# Patient Record
Sex: MALE | Race: BLACK OR AFRICAN AMERICAN | NOT HISPANIC OR LATINO | Employment: OTHER | URBAN - METROPOLITAN AREA
[De-identification: names, ages, dates, MRNs, and addresses within clinical notes are randomized per-mention and may not be internally consistent; named-entity substitution may affect disease eponyms.]

---

## 2018-02-19 ENCOUNTER — HOSPITAL ENCOUNTER (EMERGENCY)
Facility: OTHER | Age: 33
Discharge: HOME | End: 2018-02-19
Attending: EMERGENCY MEDICINE
Payer: MEDICAID

## 2018-02-19 VITALS
BODY MASS INDEX: 45.1 KG/M2 | HEIGHT: 70 IN | TEMPERATURE: 98 F | OXYGEN SATURATION: 98 % | HEART RATE: 80 BPM | SYSTOLIC BLOOD PRESSURE: 125 MMHG | WEIGHT: 315 LBS | DIASTOLIC BLOOD PRESSURE: 80 MMHG | RESPIRATION RATE: 18 BRPM

## 2018-02-19 DIAGNOSIS — G89.29 CHRONIC PAIN OF LEFT KNEE: ICD-10-CM

## 2018-02-19 DIAGNOSIS — M25.562 CHRONIC PAIN OF LEFT KNEE: ICD-10-CM

## 2018-02-19 DIAGNOSIS — M54.40 LOW BACK PAIN WITH SCIATICA, SCIATICA LATERALITY UNSPECIFIED, UNSPECIFIED BACK PAIN LATERALITY, UNSPECIFIED CHRONICITY: ICD-10-CM

## 2018-02-19 DIAGNOSIS — R30.0 DYSURIA: Primary | ICD-10-CM

## 2018-02-19 LAB
BILIRUBIN, POC UA: ABNORMAL
BLOOD, POC UA: NEGATIVE
CLARITY, POC UA: CLEAR
COLOR, POC UA: YELLOW
GLUCOSE SERPL-MCNC: 93 MG/DL (ref 70–110)
GLUCOSE, POC UA: NEGATIVE
KETONES, POC UA: ABNORMAL
LEUKOCYTE EST, POC UA: ABNORMAL
NITRITE, POC UA: NEGATIVE
PH UR STRIP: 6.5 [PH]
PROTEIN, POC UA: ABNORMAL
SPECIFIC GRAVITY, POC UA: 1.02
UROBILINOGEN, POC UA: >=8 E.U./DL

## 2018-02-19 PROCEDURE — 99283 EMERGENCY DEPT VISIT LOW MDM: CPT

## 2018-02-19 PROCEDURE — 87491 CHLMYD TRACH DNA AMP PROBE: CPT

## 2018-02-19 PROCEDURE — 81003 URINALYSIS AUTO W/O SCOPE: CPT

## 2018-02-19 PROCEDURE — 87086 URINE CULTURE/COLONY COUNT: CPT

## 2018-02-19 RX ORDER — DICLOFENAC SODIUM 50 MG/1
50 TABLET, DELAYED RELEASE ORAL 3 TIMES DAILY PRN
Qty: 21 TABLET | Refills: 0 | Status: SHIPPED | OUTPATIENT
Start: 2018-02-19 | End: 2019-01-11 | Stop reason: SDUPTHER

## 2018-02-20 LAB
C TRACH DNA SPEC QL NAA+PROBE: NOT DETECTED
N GONORRHOEA DNA SPEC QL NAA+PROBE: NOT DETECTED

## 2018-02-20 NOTE — ED PROVIDER NOTES
Encounter Date: 2/19/2018       History     Chief Complaint   Patient presents with    urinary symptoms     Frequency and discomfort when urinating, low back pain x 3 days. Pt also c/o left knee pain.     A 32-year-old male who presents to the ED with urinary frequency and dysuria and lower back pain for 3 days.  Patient reports left knee pain.  Patient has had a knee surgery and has chronic left knee pain.  Patient denies injury.      The history is provided by the patient.   Male  Problem   Primary symptoms include dysuria.  Primary symptoms include no genital lesions, no penile discharge. The current episode started several days ago. The problem has been gradually worsening. The symptoms occur during urination. Associated symptoms include frequency. Pertinent negatives include no nausea. The treatment provided no relief. Sexual activity: sexually active. Partner displays symptoms of an STD: no.     Review of patient's allergies indicates:  No Known Allergies  Past Medical History:   Diagnosis Date    Allergy     Fever blister     Hypertension     Irregular heart beat     Joint pain     Left ACL tear 6/8/2015     Past Surgical History:   Procedure Laterality Date    KNEE SURGERY      TONSILLECTOMY       Family History   Problem Relation Age of Onset    Aneurysm Mother     Hypertension Mother     No Known Problems Father      Social History   Substance Use Topics    Smoking status: Current Every Day Smoker     Packs/day: 0.50     Years: 10.00     Types: Cigarettes    Smokeless tobacco: Never Used    Alcohol use Yes      Comment: Occasional      Review of Systems   Constitutional: Negative.  Negative for fever.   HENT: Negative.  Negative for sore throat.    Eyes: Negative.    Respiratory: Negative.  Negative for shortness of breath.    Cardiovascular: Negative for chest pain.   Gastrointestinal: Negative for nausea.   Endocrine: Negative.    Genitourinary: Positive for dysuria and frequency.  Negative for penile discharge.   Musculoskeletal: Positive for back pain.        Knee pain   Skin: Negative.  Negative for rash.   Allergic/Immunologic: Negative.    Neurological: Negative.  Negative for weakness.   Hematological: Does not bruise/bleed easily.   Psychiatric/Behavioral: Negative.    All other systems reviewed and are negative.      Physical Exam     Initial Vitals [02/19/18 1949]   BP Pulse Resp Temp SpO2   (!) 168/94 92 19 98.7 °F (37.1 °C) 98 %      MAP       118.67         Physical Exam    Nursing note and vitals reviewed.  Constitutional: He appears well-nourished. He is Obese .   HENT:   Head: Normocephalic and atraumatic.   Right Ear: External ear normal.   Left Ear: External ear normal.   Nose: Nose normal.   Mouth/Throat: Oropharynx is clear and moist.   Eyes: Conjunctivae and lids are normal. Pupils are equal, round, and reactive to light.   Neck: Normal range of motion. Neck supple.   Cardiovascular: Normal rate, regular rhythm, S1 normal, S2 normal and normal heart sounds.   Pulmonary/Chest: Effort normal and breath sounds normal.   Abdominal: Soft. Normal appearance and bowel sounds are normal. There is no tenderness.   Genitourinary: Testes normal. Uncircumcised. No penile tenderness. No discharge found.   Genitourinary Comments: No lesions noted   Musculoskeletal: Normal range of motion.        Left knee: He exhibits no swelling and no effusion. No tenderness found. No medial joint line and no lateral joint line tenderness noted.   FROM of all extremities   Neurological: He is alert and oriented to person, place, and time. He has normal strength.   Skin: Skin is warm, dry and intact.   Psychiatric: He has a normal mood and affect. His speech is normal and behavior is normal. Judgment normal.         ED Course   Procedures  Labs Reviewed   POCT URINALYSIS W/O SCOPE - Abnormal; Notable for the following:        Result Value    Glucose, UA Negative (*)     Bilirubin, UA 1+ (*)     Ketones,  UA 1+ (*)     Blood, UA Negative (*)     Protein, UA 1+ (*)     Urobilinogen, UA >=8.0 (*)     Nitrite, UA Negative (*)     Leukocytes, UA Trace (*)     All other components within normal limits   POCT URINALYSIS W/O SCOPE             Medical Decision Making:   Initial Assessment:   A 32-year-old male who presents to the ED with dysuria and urinary frequency low back pain ×3 days.  Patient reports chronic left knee pain.  Patient has had a knee surgery in the past.  Patient denies injury.  Urinalysis within normal limits.  Discuss possibilities of STD exposure.  Patient states he is not sure.  Patient denies [place] discharge or lesions.  Differential Diagnosis:   Urinary tract infection  STD exposure  Chronic left knee pain  ED Management:  Physical exam.  Urinalysis.  Urine culture pending.  Gonorrhea and chlamydia.  Glucose POC.  Patient discharged home with diclofenac when necessary.  Follow-up with PCP in 2-3 days.  Return to ED for worsening of symptoms.                      Clinical Impression:   The primary encounter diagnosis was Dysuria. Diagnoses of Chronic pain of left knee and Low back pain with sciatica, sciatica laterality unspecified, unspecified back pain laterality, unspecified chronicity were also pertinent to this visit.                           Marsha Rizzo, STEWART  02/19/18 8701

## 2018-02-21 LAB — BACTERIA UR CULT: NO GROWTH

## 2018-08-19 ENCOUNTER — HOSPITAL ENCOUNTER (EMERGENCY)
Facility: HOSPITAL | Age: 33
Discharge: HOME | End: 2018-08-19
Attending: EMERGENCY MEDICINE
Payer: MEDICARE

## 2018-08-19 VITALS
DIASTOLIC BLOOD PRESSURE: 78 MMHG | WEIGHT: 315 LBS | RESPIRATION RATE: 19 BRPM | HEIGHT: 70 IN | OXYGEN SATURATION: 98 % | SYSTOLIC BLOOD PRESSURE: 130 MMHG | BODY MASS INDEX: 45.1 KG/M2 | TEMPERATURE: 99 F | HEART RATE: 93 BPM

## 2018-08-19 DIAGNOSIS — R05.9 COUGH: ICD-10-CM

## 2018-08-19 DIAGNOSIS — R53.1 GENERALIZED WEAKNESS: Primary | ICD-10-CM

## 2018-08-19 LAB
ALBUMIN SERPL-MCNC: 3.6 G/DL (ref 3.3–5.5)
ALP SERPL-CCNC: 40 U/L (ref 42–141)
BASOPHILS # BLD AUTO: 0.02 K/UL
BASOPHILS NFR BLD: 0.2 %
BILIRUB SERPL-MCNC: 1.5 MG/DL (ref 0.2–1.6)
BILIRUBIN, POC UA: NEGATIVE
BLOOD, POC UA: NEGATIVE
BUN SERPL-MCNC: 9 MG/DL (ref 7–22)
CALCIUM SERPL-MCNC: 9.1 MG/DL (ref 8–10.3)
CHLORIDE SERPL-SCNC: 99 MMOL/L (ref 98–108)
CLARITY, POC UA: CLEAR
COLOR, POC UA: ABNORMAL
CREAT SERPL-MCNC: 1 MG/DL (ref 0.6–1.2)
DIFFERENTIAL METHOD: ABNORMAL
EOSINOPHIL # BLD AUTO: 0 K/UL
EOSINOPHIL NFR BLD: 0.1 %
ERYTHROCYTE [DISTWIDTH] IN BLOOD BY AUTOMATED COUNT: 13.4 %
GLUCOSE SERPL-MCNC: 94 MG/DL (ref 73–118)
GLUCOSE, POC UA: NEGATIVE
HCT VFR BLD AUTO: 44.2 %
HGB BLD-MCNC: 15.1 G/DL
KETONES, POC UA: NEGATIVE
LEUKOCYTE EST, POC UA: ABNORMAL
LYMPHOCYTES # BLD AUTO: 1.1 K/UL
LYMPHOCYTES NFR BLD: 8.9 %
MCH RBC QN AUTO: 29.8 PG
MCHC RBC AUTO-ENTMCNC: 34.2 G/DL
MCV RBC AUTO: 87 FL
MONOCYTES # BLD AUTO: 1.2 K/UL
MONOCYTES NFR BLD: 9.1 %
NEUTROPHILS # BLD AUTO: 10.3 K/UL
NEUTROPHILS NFR BLD: 81.2 %
NITRITE, POC UA: NEGATIVE
PH UR STRIP: 5.5 [PH]
PLATELET # BLD AUTO: 285 K/UL
PMV BLD AUTO: 9.5 FL
POC ALT (SGPT): 35 U/L (ref 10–47)
POC AST (SGOT): 35 U/L (ref 11–38)
POC TCO2: 28 MMOL/L (ref 18–33)
POTASSIUM BLD-SCNC: 3.2 MMOL/L (ref 3.6–5.1)
PROTEIN, POC UA: NEGATIVE
PROTEIN, POC: 7.5 G/DL (ref 6.4–8.1)
RBC # BLD AUTO: 5.07 M/UL
SODIUM BLD-SCNC: 138 MMOL/L (ref 128–145)
SPECIFIC GRAVITY, POC UA: >=1.03
UROBILINOGEN, POC UA: 0.2 E.U./DL
WBC # BLD AUTO: 12.66 K/UL

## 2018-08-19 PROCEDURE — 99284 EMERGENCY DEPT VISIT MOD MDM: CPT

## 2018-08-19 PROCEDURE — 93005 ELECTROCARDIOGRAM TRACING: CPT

## 2018-08-19 PROCEDURE — 85025 COMPLETE CBC W/AUTO DIFF WBC: CPT | Mod: 91

## 2018-08-19 PROCEDURE — 25000003 PHARM REV CODE 250: Performed by: EMERGENCY MEDICINE

## 2018-08-19 PROCEDURE — 81003 URINALYSIS AUTO W/O SCOPE: CPT

## 2018-08-19 PROCEDURE — 85025 COMPLETE CBC W/AUTO DIFF WBC: CPT

## 2018-08-19 PROCEDURE — 93010 ELECTROCARDIOGRAM REPORT: CPT | Mod: ,,, | Performed by: INTERNAL MEDICINE

## 2018-08-19 PROCEDURE — 80053 COMPREHEN METABOLIC PANEL: CPT

## 2018-08-19 RX ORDER — POTASSIUM CHLORIDE 20 MEQ/1
40 TABLET, EXTENDED RELEASE ORAL
Status: COMPLETED | OUTPATIENT
Start: 2018-08-19 | End: 2018-08-19

## 2018-08-19 RX ADMIN — POTASSIUM CHLORIDE 40 MEQ: 1500 TABLET, EXTENDED RELEASE ORAL at 05:08

## 2018-08-19 NOTE — ED PROVIDER NOTES
Encounter Date: 8/19/2018       History     Chief Complaint   Patient presents with    Fatigue     pt reports since 1am this morning, his body has been weak. pt also reports chills and sweats since yesterday. pt denies any chest pain or SOB.     Chief complaint:  Body aches  33-year-old said that he awoke around 1:00 a.m. this morning with generalized body aches that he describes as stabbing.  He also felt hot and cold.  He slept most of the day.  Patient feels mildly weak.  His symptoms have been constant.  He had 1 episode of diarrhea.  He does report dysuria.  He has a mild cough.  He has a slight headache and dizziness.  Patient did not take anything for his symptoms. He denies alcohol or drug abuse.      The history is provided by the patient.     Review of patient's allergies indicates:  No Known Allergies  Past Medical History:   Diagnosis Date    Allergy     Fever blister     Hypertension     Irregular heart beat     Joint pain     Left ACL tear 6/8/2015     Past Surgical History:   Procedure Laterality Date    KNEE SURGERY      TONSILLECTOMY       Family History   Problem Relation Age of Onset    Aneurysm Mother     Hypertension Mother     No Known Problems Father      Social History     Tobacco Use    Smoking status: Current Every Day Smoker     Packs/day: 0.50     Years: 10.00     Pack years: 5.00     Types: Cigarettes    Smokeless tobacco: Never Used   Substance Use Topics    Alcohol use: Yes     Comment: Occasional     Drug use: No     Review of Systems   Constitutional: Positive for fatigue. Negative for fever.        Hot and cold   HENT: Negative for sore throat.    Eyes: Negative for photophobia.   Respiratory: Positive for cough. Negative for shortness of breath.    Cardiovascular: Negative for chest pain.   Gastrointestinal: Positive for diarrhea (X1). Negative for nausea.   Genitourinary: Positive for dysuria.   Musculoskeletal: Positive for myalgias. Negative for back pain.    Skin: Negative for rash.   Neurological: Positive for dizziness and headaches. Negative for weakness.       Physical Exam     Initial Vitals [08/19/18 1610]   BP Pulse Resp Temp SpO2   (!) 147/89 108 19 98.9 °F (37.2 °C) 98 %      MAP       --         Physical Exam    Constitutional: He appears well-developed and well-nourished. He is Obese .   HENT:   Head: Normocephalic and atraumatic.   Eyes: EOM are normal. Pupils are equal, round, and reactive to light.   Neck: Neck supple.   Cardiovascular: Normal rate, regular rhythm and normal heart sounds.   Pulmonary/Chest: Breath sounds normal.   Abdominal: Soft. There is no tenderness. There is no rebound and no guarding.   Musculoskeletal: Normal range of motion.   Neurological: He is alert and oriented to person, place, and time. No cranial nerve deficit.   Skin: Skin is warm and dry.   Psychiatric: He has a normal mood and affect.         ED Course   Procedures  Labs Reviewed   POCT CBC   POCT URINALYSIS W/O SCOPE   POCT CMP     EKG Readings: (Independently Interpreted)   Initial Reading: No STEMI.   Normal sinus rhythm, heart rate 96, no ST segment elevation, normal QT, normal PA, normal axis       Imaging Results    None          Medical Decision Making:   Initial Assessment:   33-year-old who complains of generalized body aches.  Patient also reports feeling hot and cold.  On exam patient's vital signs are stable.  He has no significant findings.  ED Management:  Patient will be evaluated with labs as well as EKG, chest x-ray and orthostatics.  Patient's lab showed no significant abnormalities.  Etiology of his symptoms is unclear.  Patient may be starting with an early viral syndrome.  He was advised to rest and drink plenty of fluids.  He was given strict return precautions.                      Clinical Impression:   The primary encounter diagnosis was Generalized weakness. A diagnosis of Cough was also pertinent to this visit.                             Roshni  AGUSTIN Gonzalez MD  08/19/18 0376

## 2018-11-19 ENCOUNTER — HOSPITAL ENCOUNTER (EMERGENCY)
Facility: HOSPITAL | Age: 33
Discharge: HOME | End: 2018-11-19
Attending: EMERGENCY MEDICINE
Payer: MEDICARE

## 2018-11-19 VITALS
BODY MASS INDEX: 45.1 KG/M2 | WEIGHT: 315 LBS | SYSTOLIC BLOOD PRESSURE: 130 MMHG | RESPIRATION RATE: 20 BRPM | OXYGEN SATURATION: 97 % | DIASTOLIC BLOOD PRESSURE: 76 MMHG | HEART RATE: 102 BPM | TEMPERATURE: 98 F | HEIGHT: 70 IN

## 2018-11-19 DIAGNOSIS — G89.29 CHRONIC PAIN OF LEFT KNEE: Primary | ICD-10-CM

## 2018-11-19 DIAGNOSIS — M25.562 CHRONIC PAIN OF LEFT KNEE: Primary | ICD-10-CM

## 2018-11-19 DIAGNOSIS — M10.9 ACUTE GOUT OF LEFT KNEE, UNSPECIFIED CAUSE: ICD-10-CM

## 2018-11-19 LAB — URATE SERPL-MCNC: 8.7 MG/DL

## 2018-11-19 PROCEDURE — 63600175 PHARM REV CODE 636 W HCPCS: Performed by: EMERGENCY MEDICINE

## 2018-11-19 PROCEDURE — 96372 THER/PROPH/DIAG INJ SC/IM: CPT

## 2018-11-19 PROCEDURE — 99284 EMERGENCY DEPT VISIT MOD MDM: CPT | Mod: 25

## 2018-11-19 PROCEDURE — 84550 ASSAY OF BLOOD/URIC ACID: CPT

## 2018-11-19 RX ORDER — KETOROLAC TROMETHAMINE 30 MG/ML
60 INJECTION, SOLUTION INTRAMUSCULAR; INTRAVENOUS
Status: COMPLETED | OUTPATIENT
Start: 2018-11-19 | End: 2018-11-19

## 2018-11-19 RX ORDER — INDOMETHACIN 25 MG/1
50 CAPSULE ORAL
Qty: 30 CAPSULE | Refills: 0 | Status: SHIPPED | OUTPATIENT
Start: 2018-11-19 | End: 2018-12-21 | Stop reason: SDUPTHER

## 2018-11-19 RX ADMIN — KETOROLAC TROMETHAMINE 60 MG: 30 INJECTION, SOLUTION INTRAMUSCULAR at 02:11

## 2018-11-19 NOTE — ED PROVIDER NOTES
"Encounter Date: 11/19/2018    SCRIBE #1 NOTE: I, Orville Herman, am scribing for, and in the presence of,  Dr. Gonzalez . I have scribed the following portions of the note - Other sections scribed: HPI, PE, ROS.       History     Chief Complaint   Patient presents with    Knee Pain     Patient reports chronic left knee pain s/p ACL repair in 2015. Taking norco w/o relief. States he ran out of his pain medication.     This is a 33 year old male presenting to ED complaining chronic left knee pain. He reports that his lower left side of his body gets "hot and swollen". He reports having had an ACL repair in 2016 and got his knee flushed in 2016. Patient has not recently seen his orthopedic surgeon.  He reports taking Norco 7.5 for his pain, but denies taking any anti-inflammatory medication. He has also attempted elevating and and using a heat/cold pack, but this did not alleviate the symptoms.  Patient is also experiencing a cough and body aches. He states he could not get out of bed all day on Saturday. Patient denies any headaches or a history of gout. He experienced a similar flair up apprx 1 month ago.       The history is provided by the patient. No  was used.     Review of patient's allergies indicates:  No Known Allergies  Past Medical History:   Diagnosis Date    Allergy     Fever blister     Hypertension     Irregular heart beat     Joint pain     Left ACL tear 6/8/2015     Past Surgical History:   Procedure Laterality Date    KNEE SURGERY      RECONSTRUCTION-LIGAMENT ANTERIOR CRUCIATE-ARTHROSCOPIC; BTB harvest; PLC reconstruction and repair Left 6/9/2015    [facility]formed by [facility]    TONSILLECTOMY       Family History   Problem Relation Age of Onset    Aneurysm Mother     Hypertension Mother     No Known Problems Father      Social History     Tobacco Use    Smoking status: Current Every Day Smoker     Packs/day: 0.50     Years: 10.00     Pack years: " 5.00     Types: Cigarettes    Smokeless tobacco: Never Used   Substance Use Topics    Alcohol use: Yes     Comment: Occasional     Drug use: No     Review of Systems   Constitutional: Negative for fever.   Respiratory: Positive for cough.    Cardiovascular: Positive for leg swelling.   Gastrointestinal: Negative for abdominal pain.   Musculoskeletal: Positive for joint swelling (left knee area). Negative for back pain.        Body aches   Neurological: Negative for weakness and headaches.       Physical Exam     Initial Vitals [11/19/18 1336]   BP Pulse Resp Temp SpO2   (!) 197/107 (!) 112 20 98 °F (36.7 °C) 99 %      MAP       --         Physical Exam    Nursing note and vitals reviewed.  Constitutional: Vital signs are normal. He appears well-developed. He is Obese .   HENT:   Head: Normocephalic and atraumatic.   Eyes: Conjunctivae are normal.   Neck: Neck supple.   Cardiovascular: Normal rate, regular rhythm, normal heart sounds, intact distal pulses and normal pulses. Exam reveals no gallop, no friction rub and no decreased pulses.    No murmur heard.  Pulmonary/Chest: Effort normal and breath sounds normal. No respiratory distress. He has no wheezes. He has no rhonchi. He has no rales.   Abdominal: Soft. Normal appearance.   Musculoskeletal: Normal range of motion.        Left knee: He exhibits no swelling and no erythema. Tenderness (mild tenderness on side) found.   Knee/leg is not abnormally hot.   Neurological: He is alert and oriented to person, place, and time. He has normal strength.   Skin: Skin is warm and dry. Capillary refill takes less than 2 seconds.   Psychiatric: He has a normal mood and affect.         ED Course   Procedures  Labs Reviewed   URIC ACID - Abnormal; Notable for the following components:       Result Value    Uric Acid 8.7 (*)     All other components within normal limits          Imaging Results    None          Medical Decision Making:   Initial Assessment:   This is a 33 year  "old male complaining of chronic left knee pain and it getting "hot and swollen" randomly. He is experiencing symptoms of body aches and a cough. He denies a fever, headache or weakness. Physical exam is significant for mild tenderness on side of left knee.   ED Management:  Patient will be treated with Toradol.  Uric acid level was sent.  I contacted the patient at 8:30 a.m. on November 20th and advised him that his uric acid level was elevated that he indeed does have gout.                  I, Dr. Roshni Gonzalez, personally performed the services described in this documentation. All medical record entries made by the scribe were at my direction and in my presence.  I have reviewed the chart and agree that the record reflects my personal performance and is accurate and complete. Roshni Gonzalez MD.  8:35 AM 11/20/2018          Clinical Impression:     1. Chronic pain of left knee    2. Acute gout of left knee, unspecified cause                                   Roshni Gonzalez MD  11/20/18 0834       Roshni Gonzalez MD  11/20/18 0835    "

## 2018-11-19 NOTE — DISCHARGE INSTRUCTIONS
Elevate as much as possible and use heat.  Call Orthopedics for close follow-up.  Also call your primary care physician.

## 2018-11-19 NOTE — ED NOTES
Patient with chronic left knee pain   Patient stated he had 2 left knee surgeries at [facility] in [place] in 2015 and 2016   Patient stated he has been out of Norco 7.5 mg tablets since Friday

## 2019-01-07 ENCOUNTER — HOSPITAL ENCOUNTER (EMERGENCY)
Facility: HOSPITAL | Age: 34
Discharge: HOME | End: 2019-01-07
Attending: INTERNAL MEDICINE
Payer: MEDICARE

## 2019-01-07 VITALS
DIASTOLIC BLOOD PRESSURE: 92 MMHG | BODY MASS INDEX: 45.1 KG/M2 | SYSTOLIC BLOOD PRESSURE: 161 MMHG | RESPIRATION RATE: 18 BRPM | WEIGHT: 315 LBS | TEMPERATURE: 98 F | OXYGEN SATURATION: 99 % | HEART RATE: 102 BPM | HEIGHT: 70 IN

## 2019-01-07 DIAGNOSIS — M79.674 GREAT TOE PAIN, RIGHT: Primary | ICD-10-CM

## 2019-01-07 PROCEDURE — 99283 EMERGENCY DEPT VISIT LOW MDM: CPT | Mod: ER

## 2019-01-07 RX ORDER — IBUPROFEN 800 MG/1
800 TABLET ORAL EVERY 8 HOURS PRN
Qty: 30 TABLET | Refills: 0 | OUTPATIENT
Start: 2019-01-07 | End: 2019-08-11

## 2019-01-08 NOTE — ED PROVIDER NOTES
Encounter Date: 1/7/2019    SCRIBE #1 NOTE: I, Jackie Perry, am scribing for, and in the presence of,  Dr. Dawkins. I have scribed the following portions of the note - Other sections scribed: HPI, ROS, PE.       History     Chief Complaint   Patient presents with    right foot and toe pain     pt c/o right foot and toe pain after injury and fx of great toe x1 month ago. pt seen and dx at [facility]. reprots still having pain, denies follow up visit due to billing issues, denies new trauma or injury, denies medication for pain today     33 y.o male presents with right foot pain for 1 month. He recently fractured his great big toe and was diagnosed and treated at [facility]. He did not follow up with his PCP. He denies any new trauma or injury. He also denies numbness to tingling to the toe. He came to the ED due his prescription ibuprofen not working and wanting his narco prescription refilled.        The history is provided by the patient.     Review of patient's allergies indicates:  No Known Allergies  Past Medical History:   Diagnosis Date    Allergy     Fever blister     Hypertension     Irregular heart beat     Joint pain     Left ACL tear 6/8/2015     Past Surgical History:   Procedure Laterality Date    KNEE SURGERY      RECONSTRUCTION-LIGAMENT ANTERIOR CRUCIATE-ARTHROSCOPIC; BTB harvest; PLC reconstruction and repair Left 6/9/2015    Performed by [facility] at [facility] OR    TONSILLECTOMY       Family History   Problem Relation Age of Onset    Aneurysm Mother     Hypertension Mother     No Known Problems Father      Social History     Tobacco Use    Smoking status: Current Every Day Smoker     Packs/day: 0.50     Years: 10.00     Pack years: 5.00     Types: Cigarettes    Smokeless tobacco: Never Used   Substance Use Topics    Alcohol use: Yes     Comment: Occasional     Drug use: No     Review of Systems   Musculoskeletal: Positive for arthralgias (right great toe pain).   Neurological: Negative  for numbness.   All other systems reviewed and are negative.      Physical Exam     Initial Vitals [01/07/19 2234]   BP Pulse Resp Temp SpO2   (!) 161/92 102 18 97.6 °F (36.4 °C) 99 %      MAP       --         Physical Exam    Nursing note and vitals reviewed.  Constitutional: He appears well-developed and well-nourished. He is not diaphoretic. He is Obese . No distress.   HENT:   Head: Normocephalic and atraumatic.   Eyes: Conjunctivae and EOM are normal.   Neck: Normal range of motion.   Cardiovascular: Intact distal pulses.   Pulmonary/Chest: No respiratory distress.   Musculoskeletal: Normal range of motion. He exhibits no edema or tenderness.        Right foot: There is no tenderness, no swelling, normal capillary refill, no crepitus, no deformity and no laceration.        Feet:    Neurological: He is alert and oriented to person, place, and time. No cranial nerve deficit or sensory deficit.   Skin: Skin is warm and dry. Capillary refill takes less than 2 seconds.   Psychiatric: He has a normal mood and affect. His behavior is normal.         ED Course   Procedures  Labs Reviewed - No data to display       Imaging Results    None          Medical Decision Making:   Initial Assessment:   33 y.o male presents with right foot pain for 1 month. He recently fractured his great big toe and was diagnosed and treated at [facility]. He did not follow up with his PCP. He denies any new trauma or injury. He also denies numbness to tingling to the toe. He came to the ED due his prescription ibuprofen not working and wanting his narco prescription refilled.              Scribe Attestation:   Scribe #1: I performed the above scribed service and the documentation accurately describes the services I performed. I attest to the accuracy of the note.    This document was produced by a scribe under my direction and in my presence. I agree with the content of the note and have made any necessary edits.     Dr. Dawkins    01/08/2019  2:40 AM             Clinical Impression:     1. Great toe pain, right          Disposition:   Disposition: Discharged  Condition: Stable                        Anupam Dawkins MD  01/08/19 0240

## 2019-04-13 ENCOUNTER — HOSPITAL ENCOUNTER (EMERGENCY)
Facility: HOSPITAL | Age: 34
Discharge: HOME | End: 2019-04-13
Attending: EMERGENCY MEDICINE
Payer: MEDICARE

## 2019-04-13 VITALS
SYSTOLIC BLOOD PRESSURE: 165 MMHG | HEIGHT: 70 IN | RESPIRATION RATE: 18 BRPM | OXYGEN SATURATION: 98 % | HEART RATE: 98 BPM | DIASTOLIC BLOOD PRESSURE: 96 MMHG | BODY MASS INDEX: 45.1 KG/M2 | WEIGHT: 315 LBS | TEMPERATURE: 98 F

## 2019-04-13 DIAGNOSIS — L03.116 CELLULITIS OF LEFT LOWER EXTREMITY: ICD-10-CM

## 2019-04-13 DIAGNOSIS — I87.2 ACUTE BILATERAL VENOUS STASIS DERMATITIS: Primary | ICD-10-CM

## 2019-04-13 DIAGNOSIS — M79.672 LEFT FOOT PAIN: ICD-10-CM

## 2019-04-13 DIAGNOSIS — M79.605 LEFT LEG PAIN: ICD-10-CM

## 2019-04-13 DIAGNOSIS — M79.89 LEFT LEG SWELLING: ICD-10-CM

## 2019-04-13 LAB
ALBUMIN SERPL-MCNC: 3.4 G/DL
ALP SERPL-CCNC: 64 U/L
BILIRUB SERPL-MCNC: 0.6 MG/DL
BUN SERPL-MCNC: 12 MG/DL
CALCIUM SERPL-MCNC: 9.7 MG/DL
CHLORIDE SERPL-SCNC: 106 MMOL/L
CREAT SERPL-MCNC: 0.9 MG/DL
CRP SERPL-MCNC: 4.5 MG/L (ref 0–8.2)
ERYTHROCYTE [SEDIMENTATION RATE] IN BLOOD BY WESTERGREN METHOD: 2 MM/HR (ref 0–10)
GLUCOSE SERPL-MCNC: 119 MG/DL (ref 70–110)
POC ALT (SGPT): 41 U/L
POC AST (SGOT): 40 U/L
POC B-TYPE NATRIURETIC PEPTIDE: <5 PG/ML (ref 0–100)
POC D-DI: <100 NG/ML (ref 0–450)
POC TCO2: 28 MMOL/L
POTASSIUM BLD-SCNC: 3.8 MMOL/L
PROTEIN, POC: 7.3 G/DL
SODIUM BLD-SCNC: 145 MMOL/L

## 2019-04-13 PROCEDURE — 85652 RBC SED RATE AUTOMATED: CPT

## 2019-04-13 PROCEDURE — 85379 FIBRIN DEGRADATION QUANT: CPT | Mod: ER

## 2019-04-13 PROCEDURE — 25000003 PHARM REV CODE 250: Mod: ER | Performed by: EMERGENCY MEDICINE

## 2019-04-13 PROCEDURE — 96374 THER/PROPH/DIAG INJ IV PUSH: CPT | Mod: ER

## 2019-04-13 PROCEDURE — 63600175 PHARM REV CODE 636 W HCPCS: Mod: ER | Performed by: EMERGENCY MEDICINE

## 2019-04-13 PROCEDURE — 93010 ELECTROCARDIOGRAM REPORT: CPT | Mod: ,,, | Performed by: INTERNAL MEDICINE

## 2019-04-13 PROCEDURE — 85025 COMPLETE CBC W/AUTO DIFF WBC: CPT | Mod: ER

## 2019-04-13 PROCEDURE — 90471 IMMUNIZATION ADMIN: CPT | Mod: ER | Performed by: EMERGENCY MEDICINE

## 2019-04-13 PROCEDURE — 90715 TDAP VACCINE 7 YRS/> IM: CPT | Mod: ER | Performed by: EMERGENCY MEDICINE

## 2019-04-13 PROCEDURE — 93005 ELECTROCARDIOGRAM TRACING: CPT | Mod: ER

## 2019-04-13 PROCEDURE — 83880 ASSAY OF NATRIURETIC PEPTIDE: CPT | Mod: ER

## 2019-04-13 PROCEDURE — 99285 EMERGENCY DEPT VISIT HI MDM: CPT | Mod: 25,ER

## 2019-04-13 PROCEDURE — 80053 COMPREHEN METABOLIC PANEL: CPT | Mod: ER

## 2019-04-13 PROCEDURE — S0077 INJECTION, CLINDAMYCIN PHOSP: HCPCS | Mod: ER | Performed by: EMERGENCY MEDICINE

## 2019-04-13 PROCEDURE — 86140 C-REACTIVE PROTEIN: CPT

## 2019-04-13 PROCEDURE — 93010 EKG 12-LEAD: ICD-10-PCS | Mod: ,,, | Performed by: INTERNAL MEDICINE

## 2019-04-13 RX ORDER — CLINDAMYCIN PHOSPHATE 150 MG/ML
900 INJECTION, SOLUTION INTRAVENOUS
Status: COMPLETED | OUTPATIENT
Start: 2019-04-13 | End: 2019-04-13

## 2019-04-13 RX ORDER — MUPIROCIN 20 MG/G
OINTMENT TOPICAL 2 TIMES DAILY
Qty: 22 G | Refills: 0 | Status: SHIPPED | OUTPATIENT
Start: 2019-04-13 | End: 2019-04-23

## 2019-04-13 RX ORDER — CLINDAMYCIN HYDROCHLORIDE 150 MG/1
300 CAPSULE ORAL 4 TIMES DAILY
Qty: 56 CAPSULE | Refills: 0 | Status: SHIPPED | OUTPATIENT
Start: 2019-04-13 | End: 2019-04-23

## 2019-04-13 RX ADMIN — CLINDAMYCIN PHOSPHATE 900 MG: 150 INJECTION, SOLUTION INTRAVENOUS at 06:04

## 2019-04-13 RX ADMIN — CLOSTRIDIUM TETANI TOXOID ANTIGEN (FORMALDEHYDE INACTIVATED), CORYNEBACTERIUM DIPHTHERIAE TOXOID ANTIGEN (FORMALDEHYDE INACTIVATED), BORDETELLA PERTUSSIS TOXOID ANTIGEN (GLUTARALDEHYDE INACTIVATED), BORDETELLA PERTUSSIS FILAMENTOUS HEMAGGLUTININ ANTIGEN (FORMALDEHYDE INACTIVATED), BORDETELLA PERTUSSIS PERTACTIN ANTIGEN, AND BORDETELLA PERTUSSIS FIMBRIAE 2/3 ANTIGEN 0.5 ML: 5; 2; 2.5; 5; 3; 5 INJECTION, SUSPENSION INTRAMUSCULAR at 06:04

## 2019-04-13 NOTE — ED PROVIDER NOTES
Encounter Date: 4/13/2019       History     Chief Complaint   Patient presents with    Leg Swelling     pt c/o L leg swelling and pain x 2 days     34 y.o. Male with obesity, HTN and others presents to ED c/o acute left foot, ankle and lower leg swelling that began yesterday. He denies trauma but reports tingling sensation in the left foot and an open sore to the top of left foot that began draining clear liquid yesterday.  He denies fever, gait disturbance or extremity weakness.  He further denies CP, SOB, dizziness or syncope.      The history is provided by the patient.     Review of patient's allergies indicates:  No Known Allergies  Past Medical History:   Diagnosis Date    Allergy     Fever blister     Hypertension     Irregular heart beat     Joint pain     Left ACL tear 6/8/2015     Past Surgical History:   Procedure Laterality Date    KNEE SURGERY      RECONSTRUCTION-LIGAMENT ANTERIOR CRUCIATE-ARTHROSCOPIC; BTB harvest; PLC reconstruction and repair Left 6/9/2015    Performed by Jesus Morris MD at [facility]    TONSILLECTOMY       Family History   Problem Relation Age of Onset    Aneurysm Mother     Hypertension Mother     No Known Problems Father      Social History     Tobacco Use    Smoking status: Current Every Day Smoker     Packs/day: 0.50     Years: 10.00     Pack years: 5.00     Types: Cigarettes    Smokeless tobacco: Never Used   Substance Use Topics    Alcohol use: Yes     Comment: Occasional     Drug use: No     Review of Systems   Constitutional: Negative for fever.   Respiratory: Negative for shortness of breath.    Cardiovascular: Positive for leg swelling. Negative for chest pain and palpitations.   Musculoskeletal: Positive for joint swelling (chronic left knee swelling). Negative for arthralgias (denies left knee pain today), back pain and gait problem.   Skin: Positive for wound.   Neurological: Negative for weakness and numbness.   All other systems reviewed and are  negative.      Physical Exam     Initial Vitals [04/13/19 0521]   BP Pulse Resp Temp SpO2   (!) 155/93 (!) 112 20 98.1 °F (36.7 °C) 97 %      MAP       --         Physical Exam    Nursing note and vitals reviewed.  Constitutional: He appears well-developed and well-nourished. He is not diaphoretic. He is Obese . He is cooperative.  Non-toxic appearance. No distress.   HENT:   Head: Normocephalic.   Mouth/Throat: Oropharynx is clear and moist.   Eyes: Conjunctivae are normal.   Neck: Neck supple.   Cardiovascular: Regular rhythm and intact distal pulses. Tachycardia present.    Pulses:       Dorsalis pedis pulses are 2+ on the right side, and 2+ on the left side.   Pulmonary/Chest: No stridor. No respiratory distress.   Abdominal: There is no tenderness.   Musculoskeletal: He exhibits edema and tenderness.   Neurological: He is alert and oriented to person, place, and time.   Skin: Skin is warm. No abscess noted. Lesion: trace. There is erythema.                                  ED Course   Procedures  Labs Reviewed   POCT B-TYPE NATRIURETIC PEPTIDE (BNP) - Abnormal; Notable for the following components:       Result Value    POC B-Type Natriuretic Peptide <5.0 (*)     All other components within normal limits   POCT D DIMER - Abnormal; Notable for the following components:    POC D-DI <100 (*)     All other components within normal limits   SEDIMENTATION RATE   C-REACTIVE PROTEIN   POCT CBC   POCT CMP   POCT B-TYPE NATRIURETIC PEPTIDE (BNP)   POCT D DIMER   POCT CMP     EKG Readings: (Independently Interpreted)   Initial Reading: No STEMI. Rhythm: Normal Sinus Rhythm. Heart Rate: 97. Ectopy: No Ectopy. Conduction: Normal. ST Segments: Normal ST Segments. T Waves: Normal. Axis: Normal.       Imaging Results          US Lower Extremity Veins Left (Final result)  Result time 04/13/19 07:14:27    Final result by Derek Interiano DO (04/13/19 07:14:27)                 Impression:      No evidence for left lower extremity  deep venous thrombosis.    Nonspecific prominent left groin lymph node.    Clinical correlation advised.      Electronically signed by: Derek Interiano DO  Date:    04/13/2019  Time:    07:14             Narrative:    CLINICAL HISTORY:  Other specified soft tissue disorders    TECHNIQUE:  Duplex and color flow Doppler evaluation of the left lower extremity veins was performed.grayscale graded compression, color-flow and Doppler wave form imaging of the left lower extremity venous system.    COMPARISON:  None    FINDINGS:  The left common femoral, superficial femoral and popliteal veins demonstrate normal gray scale graded compression, color-flow and Doppler wave forms. The Doppler wave form imaging demonstrate normal respiratory phasicity and augmentation. No evidence for left lower extremity deep venous thrombosis.  Nonspecific prominent left groin lymph node measuring 5.1 x 1.0 x 6.1 cm.                               X-Ray Tibia Fibula 2 View Left (Final result)  Result time 04/13/19 07:12:08    Final result by Derek Interiano DO (04/13/19 07:12:08)                 Impression:      Please see above      Electronically signed by: Derek Interiano DO  Date:    04/13/2019  Time:    07:12             Narrative:    EXAMINATION:  XR TIBIA FIBULA 2 VIEW LEFT    CLINICAL HISTORY:  Pain in left leg    TECHNIQUE:  AP and lateral views of the left tibia and fibula were performed.    COMPARISON:  None.    FINDINGS:  There is no evidence for acute fracture or dislocation left tibia or fibula.  There is hardware within the proximal tibia and partially visualized distal femur from ACL repair.  There is focal lucency within the proximal fibula likely sequela of prior hardware removal.  Clinical correlation and further evaluation as warranted.                               X-Ray Foot Complete Left (Final result)  Result time 04/13/19 07:15:00    Final result by Derek Interiano DO (04/13/19 07:15:00)                 Impression:       Please see above      Electronically signed by: Derek DO Jaydon  Date:    04/13/2019  Time:    07:15             Narrative:    EXAMINATION:  XR FOOT COMPLETE 3 VIEW LEFT    CLINICAL HISTORY:  .  Pain in left foot    TECHNIQUE:  AP, lateral and oblique views of the left foot were performed.    COMPARISON:  None    FINDINGS:  There is no evidence for acute fracture or dislocation left foot.  Slight hallux valgus deformity.  No focal bony erosion.  Further evaluation as warranted clinically.                                 Medical Decision Making:   Clinical Tests:   Lab Tests: Ordered and Reviewed       <> Summary of Lab: White count 9.3 H&H 15.4 and 45.8 platelets 268 MCV 85.9 RDW 13.8  Radiological Study: Ordered                    Discharge Medications     Discharge Medication List as of 4/13/2019  7:20 AM      START taking these medications    Details   clindamycin (CLEOCIN) 150 MG capsule Take 2 capsules (300 mg total) by mouth 4 (four) times daily. for 10 days, Starting Sat 4/13/2019, Until Tue 4/23/2019, Print      mupirocin (BACTROBAN) 2 % ointment Apply topically 2 (two) times daily. for 10 days, Starting Sat 4/13/2019, Until Tue 4/23/2019, Print         CONTINUE these medications which have NOT CHANGED    Details   amLODIPine (NORVASC) 10 MG tablet Take 1 tablet (10 mg total) by mouth once daily., Starting Mon 4/8/2019, Normal      azithromycin (Z-CIERRA) 250 MG tablet Take 2 tablets on day 1, then take 1 tablet on days 2-5, Normal      calcium carbonate (OS-KIRSTIE) 600 mg (1,500 mg) Tab Take 1 tablet (600 mg total) by mouth 2 (two) times daily with meals., Starting 2/13/2017, Until Discontinued, Normal      cloNIDine (CATAPRES) 0.1 MG tablet Take 1 tablet (0.1 mg total) by mouth 2 (two) times daily., Starting Mon 4/8/2019, Normal      diclofenac (VOLTAREN) 50 MG EC tablet Take 1 tablet (50 mg total) by mouth 2 (two) times daily as needed., Starting Fri 1/11/2019, Print      diclofenac sodium (VOLTAREN) 1 % Gel  Apply 2 g topically 3 (three) times daily., Starting Fri 1/11/2019, Normal      ergocalciferol (ERGOCALCIFEROL) 50,000 unit Cap Take 1 capsule (50,000 Units total) by mouth every 7 days., Starting Fri 12/21/2018, Normal      fluticasone (FLONASE) 50 mcg/actuation nasal spray 1-2 sprays ( mcg total) by Each Nare route once daily., Starting Mon 3/5/2018, Normal      hydroCHLOROthiazide (HYDRODIURIL) 25 MG tablet Take 1 tablet (25 mg total) by mouth once daily., Starting Mon 4/8/2019, Normal      HYDROcodone-acetaminophen (NORCO) 7.5-325 mg per tablet 1-2 tablets PO QD prn pain, Print      ibuprofen (ADVIL,MOTRIN) 800 MG tablet Take 1 tablet (800 mg total) by mouth every 8 (eight) hours as needed for Pain., Starting Mon 1/7/2019, Print      indomethacin (INDOCIN) 25 MG capsule Take 2 capsules (50 mg total) by mouth 3 (three) times daily with meals., Starting Fri 12/21/2018, Print      metoprolol succinate (TOPROL-XL) 50 MG 24 hr tablet Take 1 tablet (50 mg total) by mouth once daily., Starting Fri 2/22/2019, Normal      promethazine-codeine 6.25-10 mg/5 ml (PHENERGAN WITH CODEINE) 6.25-10 mg/5 mL syrup Take 5 mLs by mouth every 8 (eight) hours as needed for Cough. DO NOT DRIVE AFTER TAKING MED, Starting Mon 4/8/2019, Print      promethazine-dextromethorphan (PROMETHAZINE-DM) 6.25-15 mg/5 mL Syrp 1 teaspoon PO Q 8 hrs PRN cough. DO NOT DRIVE AFTER TAKING MED, Normal      triamcinolone acetonide 0.1% (KENALOG) 0.1 % ointment APPLY TO AFFECTED AREA TWICE DAILY X 2 WEEKS, THEN AS NEEDED FOR FLARE UPS ONLY. NEVER TO FACE,GROIN, Normal                   Patient discharged to home in stable condition with instructions to:   1. Please take all meds as prescribed.  2. Follow-up with your primary care doctor   3. Return precautions discussed and patient and/or family/caretaker understands to return to the emergency room for any concerns including worsening of your current symptoms, fever, chills, night sweats, worsening  pain, chest pain, shortness of breath, nausea, vomiting, diarrhea, bleeding, headache, difficulty talking, visual disturbances, weakness, numbness or any other acute concerns       Clinical Impression:       ICD-10-CM ICD-9-CM   1. Acute bilateral venous stasis dermatitis I87.2 454.1   2. Left leg swelling M79.89 729.81   3. Left foot pain M79.672 729.5   4. Left leg pain M79.605 729.5   5. Cellulitis of left lower extremity L03.116 682.6         Disposition:   Disposition: Discharged  Condition: Stable                        Pamela Ramierz MD  04/13/19 4525

## 2019-05-06 ENCOUNTER — HOSPITAL ENCOUNTER (EMERGENCY)
Facility: HOSPITAL | Age: 34
Discharge: HOME | End: 2019-05-06
Attending: EMERGENCY MEDICINE
Payer: MEDICARE

## 2019-05-06 VITALS
OXYGEN SATURATION: 98 % | TEMPERATURE: 99 F | WEIGHT: 315 LBS | HEART RATE: 86 BPM | BODY MASS INDEX: 45.1 KG/M2 | SYSTOLIC BLOOD PRESSURE: 148 MMHG | DIASTOLIC BLOOD PRESSURE: 76 MMHG | HEIGHT: 70 IN | RESPIRATION RATE: 17 BRPM

## 2019-05-06 DIAGNOSIS — I89.0 LYMPHEDEMA: ICD-10-CM

## 2019-05-06 DIAGNOSIS — R60.9 EDEMA: ICD-10-CM

## 2019-05-06 DIAGNOSIS — R60.0 LEG EDEMA, LEFT: Primary | ICD-10-CM

## 2019-05-06 LAB
ALBUMIN SERPL-MCNC: 3.8 G/DL (ref 3.3–5.5)
ALP SERPL-CCNC: 53 U/L (ref 42–141)
BILIRUB SERPL-MCNC: 1.2 MG/DL (ref 0.2–1.6)
BILIRUBIN, POC UA: NEGATIVE
BLOOD, POC UA: NEGATIVE
BUN SERPL-MCNC: 10 MG/DL (ref 7–22)
CALCIUM SERPL-MCNC: 9.4 MG/DL (ref 8–10.3)
CHLORIDE SERPL-SCNC: 102 MMOL/L (ref 98–108)
CLARITY, POC UA: CLEAR
COLOR, POC UA: YELLOW
CREAT SERPL-MCNC: 1.1 MG/DL (ref 0.6–1.2)
GLUCOSE SERPL-MCNC: 98 MG/DL (ref 73–118)
GLUCOSE, POC UA: NEGATIVE
KETONES, POC UA: NEGATIVE
LEUKOCYTE EST, POC UA: ABNORMAL
NITRITE, POC UA: NEGATIVE
PH UR STRIP: 5.5 [PH]
POC ALT (SGPT): 42 U/L (ref 10–47)
POC AST (SGOT): 50 U/L (ref 11–38)
POC B-TYPE NATRIURETIC PEPTIDE: 8.8 PG/ML
POC TCO2: 30 MMOL/L (ref 18–33)
POTASSIUM BLD-SCNC: 3.9 MMOL/L (ref 3.6–5.1)
PROTEIN, POC UA: NEGATIVE
PROTEIN, POC: 7.9 G/DL (ref 6.4–8.1)
SODIUM BLD-SCNC: 143 MMOL/L (ref 128–145)
SPECIFIC GRAVITY, POC UA: >=1.03
UROBILINOGEN, POC UA: 0.2 E.U./DL

## 2019-05-06 PROCEDURE — 81003 URINALYSIS AUTO W/O SCOPE: CPT | Mod: ER

## 2019-05-06 PROCEDURE — 36000 PLACE NEEDLE IN VEIN: CPT | Mod: ER

## 2019-05-06 PROCEDURE — 80053 COMPREHEN METABOLIC PANEL: CPT | Mod: ER

## 2019-05-06 PROCEDURE — 85025 COMPLETE CBC W/AUTO DIFF WBC: CPT | Mod: ER

## 2019-05-06 PROCEDURE — 83880 ASSAY OF NATRIURETIC PEPTIDE: CPT | Mod: ER

## 2019-05-06 PROCEDURE — 99284 EMERGENCY DEPT VISIT MOD MDM: CPT | Mod: 25,ER

## 2019-05-06 NOTE — ED NOTES
IV access obtained without issue or complaint from pt. IV secured with tegaderm and tape. Blood work obtained and sent to lab during IV start. No irritation or discomfort voiced from pt upon completion.

## 2019-05-06 NOTE — ED PROVIDER NOTES
Encounter Date: 5/6/2019    SCRIBE #1 NOTE: I, Arley Hoffmann, am scribing for, and in the presence of,  STEWART Mujica. I have scribed the following portions of the note - Other sections scribed: HPI, ROS, PE.       History     Chief Complaint   Patient presents with    Joint Swelling     pt c/o left ankle swelling  for two days. pt has a hx of gout.     CC:   LLE Swelling  HPI:  This is a 34 y.o. male who presents to the ED with a chief complaint of acute intermittent nontraumatic swelling to the left foot, ankle, and back of the lower leg that began two days ago.  Pt states that this is a recurrent problem and has been seen in this ED on 04/13/19 and treated with Abx.  Pt's PMHx includes gout and notes that he is not supposed to eat seafood, but recently ate crawfish.  Pt has had previous injuries to his LLE including a torn ACL ligament.  He denies fever, CP, or SOB.         The history is provided by the patient.     Review of patient's allergies indicates:  No Known Allergies  Past Medical History:   Diagnosis Date    Allergy     Fever blister     Hypertension     Irregular heart beat     Joint pain     Left ACL tear 6/8/2015     Past Surgical History:   Procedure Laterality Date    KNEE SURGERY      RECONSTRUCTION-LIGAMENT ANTERIOR CRUCIATE-ARTHROSCOPIC; BTB harvest; PLC reconstruction and repair Left 6/9/2015    Performed by Jseus Morris [facility] at [facility] OR    TONSILLECTOMY       Family History   Problem Relation Age of Onset    Aneurysm Mother     Hypertension Mother     No Known Problems Father      Social History     Tobacco Use    Smoking status: Current Every Day Smoker     Packs/day: 0.50     Years: 10.00     Pack years: 5.00     Types: Cigarettes    Smokeless tobacco: Never Used   Substance Use Topics    Alcohol use: Yes     Comment: Occasional     Drug use: No     Review of Systems   Constitutional: Negative for fever.   Respiratory: Negative for shortness of breath.     Cardiovascular: Positive for leg swelling. Negative for chest pain.   All other systems reviewed and are negative.      Physical Exam     Initial Vitals [05/06/19 1305]   BP Pulse Resp Temp SpO2   (!) 152/92 95 18 98.6 °F (37 °C) 97 %      MAP       --         Physical Exam    Nursing note and vitals reviewed.  Constitutional: He appears well-developed and well-nourished.   HENT:   Head: Normocephalic and atraumatic.   Eyes: Conjunctivae are normal.   Neck: Normal range of motion. Neck supple.   Cardiovascular: Normal rate, regular rhythm, normal heart sounds and intact distal pulses. Exam reveals no gallop and no friction rub.    No murmur heard.  Pulses:       Dorsalis pedis pulses are 2+ on the left side.   Pulmonary/Chest: Breath sounds normal. No respiratory distress. He has no wheezes. He has no rhonchi. He has no rales. He exhibits no tenderness.   Musculoskeletal:        Left ankle: He exhibits swelling.        Right lower leg: He exhibits no swelling and no edema.        Left lower leg: He exhibits swelling and edema.        Right foot: There is no swelling.        Left foot: There is swelling.   Neurological: He is alert and oriented to person, place, and time.   Skin: Skin is warm and dry.   Psychiatric: He has a normal mood and affect. His behavior is normal.         ED Course   Procedures  Labs Reviewed   POCT URINALYSIS W/O SCOPE - Abnormal; Notable for the following components:       Result Value    Glucose, UA Negative (*)     Bilirubin, UA Negative (*)     Ketones, UA Negative (*)     Spec Grav UA >=1.030 (*)     Blood, UA Negative (*)     Protein, UA Negative (*)     Nitrite, UA Negative (*)     Leukocytes, UA 1+ (*)     All other components within normal limits   POCT CMP - Abnormal; Notable for the following components:    AST (SGOT), POC 50 (*)     All other components within normal limits   POCT CBC   POCT URINALYSIS W/O SCOPE   POCT CMP   POCT B-TYPE NATRIURETIC PEPTIDE (BNP)   POCT B-TYPE  NATRIURETIC PEPTIDE (BNP)          Imaging Results          US Lower Extremity Veins Left (Final result)  Result time 05/06/19 16:01:45    Final result by Flakita Esquivel MD (05/06/19 16:01:45)                 Impression:      No evidence of deep venous thrombosis in the left lower extremity.      Electronically signed by: Flakita Esquivel MD  Date:    05/06/2019  Time:    16:01             Narrative:    EXAMINATION:  US LOWER EXTREMITY VEINS LEFT    CLINICAL HISTORY:  Edema, unspecified    TECHNIQUE:  Duplex and color flow Doppler evaluation and graded compression of the left lower extremity veins was performed.    COMPARISON:  04/13/2019    FINDINGS:  Left thigh veins: The common femoral, femoral, popliteal, upper greater saphenous, and deep femoral veins are patent and free of thrombus. The veins are normally compressible and have normal phasic flow and augmentation response.    Left calf veins: The visualized calf veins are patent.    Contralateral CFV: The contralateral (right) common femoral vein is patent and free of thrombus.    Miscellaneous: None                                 Medical Decision Making:   Initial Assessment:   34 y.o. male with a history of gout which presents to the ED with a chief complaint of acute intermittent nontraumatic swelling to the left foot, ankle, and back of the lower leg that began two days ago.  Pt states that this is a recurrent problem and has been seen in this ED on 04/13/19 and treated with Abx.  Pt's PMHx includes gout and notes that he is not supposed to eat seafood, but recently ate crawfish.  Pt has had previous injuries to his LLE including a torn ACL ligament.  He denies fever, CP, or SOB.    Differential Diagnosis:   Lymphedema, DVT, gout, venous stasis  Clinical Tests:   Lab Tests: Ordered and Reviewed  The following lab test(s) were unremarkable: CBC, CMP, BNP and Urinalysis  Radiological Study: Ordered and Reviewed  ED Management:  Patient examined and  noted to have swelling to the left foot ankle and lower leg.  Labs and ultrasound unremarkable.  Patient does not have a DVT.  Patient advised he is most likely having lymphedema and that he should follow up with his primary care.  At discharge patient wants to know what the provider was going to give him for pain. Patient advised that he has never complained of pain while he was in the emergency room and that he can take Motrin or Tylenol as needed for pain. Patient states that he is concerned that he has gout and patient has been assured that he has no erythema or pain with palpation to the lower extremity which is inconsistent with gout. He states that he is following up with his primary care on Wednesday.  Patient given strict return precautions and voiced understanding of all discharge instructions.  Patient stable at discharge.            Scribe Attestation:   Scribe #1: I performed the above scribed service and the documentation accurately describes the services I performed. I attest to the accuracy of the note.    This document was produced by a scribe under my direction and in my presence. I agree with the content of the note and have made any necessary edits.     Didi Zhang DNP, STEWART-BC          ED Course as of May 06 1637   Mon May 06, 2019   1421 BP(!): 152/92 [AT]   1421 Temp: 98.6 °F (37 °C) [AT]   1421 Temp src: Oral [AT]   1421 Pulse: 95 [AT]   1421 Resp: 18 [AT]   1421 SpO2: 97 % [AT]   1522 POC B-Type Natriuretic Peptide: 8.8 [AT]      ED Course User Index  [AT] STEWART Calderon     Clinical Impression:     1. Leg edema, left    2. Edema    3. Lymphedema                                  STEWART Calderon  05/06/19 1637

## 2019-06-21 PROCEDURE — 99284 EMERGENCY DEPT VISIT MOD MDM: CPT | Mod: ER

## 2019-06-22 ENCOUNTER — HOSPITAL ENCOUNTER (EMERGENCY)
Facility: HOSPITAL | Age: 34
Discharge: HOME | End: 2019-06-22
Attending: EMERGENCY MEDICINE
Payer: MEDICARE

## 2019-06-22 VITALS
DIASTOLIC BLOOD PRESSURE: 99 MMHG | TEMPERATURE: 98 F | WEIGHT: 315 LBS | RESPIRATION RATE: 18 BRPM | HEART RATE: 110 BPM | BODY MASS INDEX: 45.1 KG/M2 | HEIGHT: 70 IN | OXYGEN SATURATION: 99 % | SYSTOLIC BLOOD PRESSURE: 151 MMHG

## 2019-06-22 DIAGNOSIS — M79.89 LEFT LEG SWELLING: ICD-10-CM

## 2019-06-22 DIAGNOSIS — R60.0 PERIPHERAL EDEMA: Primary | ICD-10-CM

## 2019-06-22 LAB
ALBUMIN SERPL-MCNC: 3.7 G/DL
ALP SERPL-CCNC: 54 U/L (ref 42–141)
BILIRUB SERPL-MCNC: 0.4 MG/DL (ref 0.2–1.6)
BUN SERPL-MCNC: 10 MG/DL (ref 7–22)
CALCIUM SERPL-MCNC: 9.2 MG/DL (ref 8–10.3)
CHLORIDE SERPL-SCNC: 103 MMOL/L (ref 98–108)
CREAT SERPL-MCNC: 0.9 MG/DL (ref 0.6–1.2)
GLUCOSE SERPL-MCNC: 94 MG/DL (ref 73–118)
POC ALT (SGPT): 53 U/L (ref 10–47)
POC AST (SGOT): 69 U/L (ref 11–38)
POC B-TYPE NATRIURETIC PEPTIDE: <5 PG/ML (ref 0–100)
POC TCO2: 29 MMOL/L (ref 18–33)
POTASSIUM BLD-SCNC: 5.3 MMOL/L (ref 3.6–5.1)
PROTEIN, POC: 7.2 G/DL (ref 6.4–8.1)
SODIUM BLD-SCNC: 142 MMOL/L (ref 128–145)

## 2019-06-22 PROCEDURE — 83880 ASSAY OF NATRIURETIC PEPTIDE: CPT | Mod: ER

## 2019-06-22 PROCEDURE — 80053 COMPREHEN METABOLIC PANEL: CPT | Mod: ER

## 2019-06-22 PROCEDURE — 85025 COMPLETE CBC W/AUTO DIFF WBC: CPT | Mod: ER

## 2019-06-22 NOTE — ED PROVIDER NOTES
Encounter Date: 6/21/2019    SCRIBE #1 NOTE: I, Jackie Perry, am scribing for, and in the presence of,  Dr. Ramirez. I have scribed the following portions of the note - Other sections scribed: HPI, ROS, PE.       History     Chief Complaint   Patient presents with    Foot Swelling     left foot swollen x's 3-4mnths. Pt states the swelling is getting worse.     34 y.o male with HTN presents to the ED with acute on chronic bilateral foot (right greater than left) and leg swelling that has been present for several months.  Patient reports compliance with prescribed Lasix.  He denies use of compression stockings.  Patient states he was seen by his PCP 2 weeks ago but states his doctor did not address the leg swelling.  He denies chest pain, SOB, N/V, gait disturbance, or fever.    The history is provided by the patient. No  was used.     Review of patient's allergies indicates:  No Known Allergies  Past Medical History:   Diagnosis Date    Allergy     Fever blister     Hypertension     Irregular heart beat     Joint pain     Left ACL tear 6/8/2015     Past Surgical History:   Procedure Laterality Date    KNEE SURGERY      RECONSTRUCTION-LIGAMENT ANTERIOR CRUCIATE-ARTHROSCOPIC; BTB harvest; PLC reconstruction and repair Left 6/9/2015    Performed by [facility] at [facility] OR    TONSILLECTOMY       Family History   Problem Relation Age of Onset    Aneurysm Mother     Hypertension Mother     No Known Problems Father      Social History     Tobacco Use    Smoking status: Current Every Day Smoker     Packs/day: 0.50     Years: 10.00     Pack years: 5.00     Types: Cigarettes    Smokeless tobacco: Never Used   Substance Use Topics    Alcohol use: Yes     Comment: Occasional     Drug use: No     Review of Systems   Constitutional: Negative for fever.   Respiratory: Negative for shortness of breath.    Cardiovascular: Positive for leg swelling. Negative for chest pain.   Gastrointestinal:  Negative for nausea and vomiting.   Musculoskeletal: Positive for arthralgias and joint swelling (bilateral foot ). Negative for gait problem. Back pain: knee, chronic.   Skin: Positive for wound (multiple healing sores to BLE). Negative for color change.   Neurological: Negative for weakness.   All other systems reviewed and are negative.      Physical Exam     Initial Vitals [06/22/19 0011]   BP Pulse Resp Temp SpO2   (!) 151/99 110 18 98.4 °F (36.9 °C) 99 %      MAP       --         Physical Exam    Nursing note and vitals reviewed.  Constitutional: He appears well-developed and well-nourished. He is not diaphoretic. He is Obese . No distress.   HENT:   Head: Normocephalic and atraumatic.   Eyes: EOM are normal.   Neck: Normal range of motion. Neck supple.   Cardiovascular: Intact distal pulses.   Pulses:       Dorsalis pedis pulses are 2+ on the right side, and 2+ on the left side.   Pulmonary/Chest: No respiratory distress.   Musculoskeletal: Normal range of motion. He exhibits edema. He exhibits no tenderness.        Left knee: He exhibits normal range of motion.        Right lower leg: He exhibits swelling. He exhibits no bony tenderness and no deformity.        Legs:       Right foot: There is swelling. There is normal range of motion, no tenderness, no bony tenderness, normal capillary refill, no crepitus, no deformity and no laceration.        Feet:    Neurological: He is alert and oriented to person, place, and time. No cranial nerve deficit or sensory deficit. Gait normal.   Skin: Skin is warm, dry and intact. Capillary refill takes less than 2 seconds. No bruising and no ecchymosis noted.   Psychiatric: He has a normal mood and affect. His behavior is normal.         ED Course   Procedures  Labs Reviewed   POCT CMP - Abnormal; Notable for the following components:       Result Value    ALT (SGPT), POC 53 (*)     AST (SGOT), POC 69 (*)     POC Potassium 5.3 (*)     All other components within normal  limits   POCT B-TYPE NATRIURETIC PEPTIDE (BNP) - Abnormal; Notable for the following components:    POC B-Type Natriuretic Peptide <5.0 (*)     All other components within normal limits   POCT CBC   POCT CMP   POCT B-TYPE NATRIURETIC PEPTIDE (BNP)          Imaging Results          US Lower Extremity Veins Left (Final result)  Result time 06/22/19 02:00:31    Final result by Jas Angel MD (06/22/19 02:00:31)                 Impression:      No evidence of deep venous thrombosis in the left lower extremity.      Electronically signed by: Jas Angel MD  Date:    06/22/2019  Time:    02:00             Narrative:    EXAMINATION:  US LOWER EXTREMITY VEINS LEFT    CLINICAL HISTORY:  Other specified soft tissue disorders    TECHNIQUE:  Duplex and color flow Doppler evaluation and graded compression of the left lower extremity veins was performed.    COMPARISON:  None    FINDINGS:  Left thigh veins: The common femoral, femoral, popliteal, upper greater saphenous, and deep femoral veins are patent and free of thrombus. The veins are normally compressible and have normal phasic flow and augmentation response.    Left calf veins: The visualized calf veins are patent.    Miscellaneous: None                                 Medical Decision Making:   Clinical Tests:   Lab Tests: Ordered and Reviewed       <> Summary of Lab: White count 9.8 H&H 15 and 46.2 platelets 251 MCV 87.3 RDW 13.5            Scribe Attestation:   Scribe #1: I performed the above scribed service and the documentation accurately describes the services I performed. I attest to the accuracy of the note.      Labs Reviewed  Admission on 06/22/2019   Component Date Value Ref Range Status    Albumin, POC 06/22/2019 3.7  g/dL Final    Alkaline Phosphatase, POC 06/22/2019 54  42 - 141 U/L Final    ALT (SGPT), POC 06/22/2019 53* 10 - 47 U/L Final    AST (SGOT), POC 06/22/2019 69* 11 - 38 U/L Final    POC BUN 06/22/2019 10  7 - 22 mg/dL Final     Calcium, POC 06/22/2019 9.2  8 - 10.3 mg/dL Final    POC Chloride 06/22/2019 103  98 - 108 mmol/L Final    POC Creatinine 06/22/2019 0.9  0.6 - 1.2 mg/dL Final    POC Glucose 06/22/2019 94  73 - 118 mg/dL Final    POC Potassium 06/22/2019 5.3* 3.6 - 5.1 mmol/L Final    POC Sodium 06/22/2019 142  128 - 145 mmol/L Final    Bilirubin 06/22/2019 0.4  0.2 - 1.6 mg/dL Final    POC TCO2 06/22/2019 29  18 - 33 mmol/L Final    Protein 06/22/2019 7.2  6.4 - 8.1 g/dL Final    POC B-Type Natriuretic Peptide 06/22/2019 <5.0* 0.0 - 100.0 pg/mL Final        Imaging Reviewed    Imaging Results          US Lower Extremity Veins Left (Final result)  Result time 06/22/19 02:00:31    Final result by Jas Angel MD (06/22/19 02:00:31)                 Impression:      No evidence of deep venous thrombosis in the left lower extremity.      Electronically signed by: Jas Angel MD  Date:    06/22/2019  Time:    02:00             Narrative:    EXAMINATION:  US LOWER EXTREMITY VEINS LEFT    CLINICAL HISTORY:  Other specified soft tissue disorders    TECHNIQUE:  Duplex and color flow Doppler evaluation and graded compression of the left lower extremity veins was performed.    COMPARISON:  None    FINDINGS:  Left thigh veins: The common femoral, femoral, popliteal, upper greater saphenous, and deep femoral veins are patent and free of thrombus. The veins are normally compressible and have normal phasic flow and augmentation response.    Left calf veins: The visualized calf veins are patent.    Miscellaneous: None                                Medications given in ED    Medications - No data to display    This document was produced by a scribe under my direction and in my presence. I agree with the content of the note and have made any necessary edits.     Pamela Ramirez MD         Note was created using voice recognition software. Note may have occasional typographical errors that may not have been identified and edited  despite good aga initial review prior to signing.           ED Course as of Jun 22 0329   Sat Jun 22, 2019   0304 Discharge decision delayed due to patient refusing blood work and ultrasound initially stating he just wanted pain medication.    [DL]      ED Course User Index  [DL] Pamela Ramirez MD     Clinical Impression:     1. Peripheral edema    2. Left leg swelling            Disposition:   Disposition: Discharged  Condition: Stable                        Pamela Ramirez MD  06/22/19 [id number]

## 2019-06-22 NOTE — ED NOTES
"Per md orders this writer and Kathi torres entered patient room to have him sign an refusal of treatment and patient refused to and asks to speak with MD. During this time md was in another room performing a procedure and pt said "I guess you can get some blood". Blood obtained and md aware  "

## 2019-06-22 NOTE — ED NOTES
"1st attempt to obtain  Lab work ordered per MD and pt refused stating "that blood aint gonna help fix my problem" at this time I educated patient on the purpose of blood work. MD is aware and charge nurse notified.   "

## 2019-06-22 NOTE — ED NOTES
"Pt is  Still refusing blood work and wants the doctor to "just put a needle in my foot and drain the fluid out' pt made aware of importance of lab work so that md can decide the treatment plan and he refused still.  "

## 2019-08-11 ENCOUNTER — HOSPITAL ENCOUNTER (EMERGENCY)
Facility: HOSPITAL | Age: 34
Discharge: HOME | End: 2019-08-11
Attending: INTERNAL MEDICINE
Payer: MEDICARE

## 2019-08-11 VITALS
DIASTOLIC BLOOD PRESSURE: 89 MMHG | WEIGHT: 315 LBS | TEMPERATURE: 98 F | OXYGEN SATURATION: 98 % | BODY MASS INDEX: 45.1 KG/M2 | RESPIRATION RATE: 16 BRPM | HEART RATE: 87 BPM | HEIGHT: 70 IN | SYSTOLIC BLOOD PRESSURE: 146 MMHG

## 2019-08-11 DIAGNOSIS — J06.9 ACUTE URI: Primary | ICD-10-CM

## 2019-08-11 DIAGNOSIS — M79.605 LEFT LEG PAIN: ICD-10-CM

## 2019-08-11 DIAGNOSIS — R60.0 LEG EDEMA, LEFT: ICD-10-CM

## 2019-08-11 PROCEDURE — 99284 EMERGENCY DEPT VISIT MOD MDM: CPT | Mod: ER,25

## 2019-08-11 RX ORDER — FLUTICASONE PROPIONATE 50 MCG
2 SPRAY, SUSPENSION (ML) NASAL DAILY
Qty: 15 G | Refills: 0 | Status: SHIPPED | OUTPATIENT
Start: 2019-08-11

## 2019-08-11 RX ORDER — AZELASTINE 1 MG/ML
2 SPRAY, METERED NASAL 2 TIMES DAILY
Qty: 30 ML | Refills: 0 | Status: SHIPPED | OUTPATIENT
Start: 2019-08-11 | End: 2019-10-05

## 2019-08-11 RX ORDER — IBUPROFEN 800 MG/1
800 TABLET ORAL EVERY 8 HOURS PRN
Qty: 30 TABLET | Refills: 0 | OUTPATIENT
Start: 2019-08-11 | End: 2019-10-04

## 2019-08-12 NOTE — ED PROVIDER NOTES
Encounter Date: 8/11/2019    SCRIBE #1 NOTE: I, Cristina Rizzo, am scribing for, and in the presence of,  Dr. Dawkins . I have scribed the entire note.       History     Chief Complaint   Patient presents with    Cough     PT C/O COUGH AND FEVER FOR 3 DAYS WITH BODYACHES, ALSO C/O LEFT LOWER LEG  PAIN AND SWELLING    Fever     Jesús Salomon Jr. is a 34 y.o. male who presents to the ED complaining of left lower leg swelling/pain, cough, fever, and generalized body aches for the past three days. Pt states that he tried to smoke a cigarette yesterday but couldn't. He denies any other symptoms.     The history is provided by the patient.     Review of patient's allergies indicates:  No Known Allergies  Past Medical History:   Diagnosis Date    Allergy     Fever blister     Hypertension     Irregular heart beat     Joint pain     Left ACL tear 6/8/2015     Past Surgical History:   Procedure Laterality Date    KNEE SURGERY      RECONSTRUCTION-LIGAMENT ANTERIOR CRUCIATE-ARTHROSCOPIC; BTB harvest; PLC reconstruction and repair Left 6/9/2015    [facility]formed [facility] at [facility]    TONSILLECTOMY       Family History   Problem Relation Age of Onset    Aneurysm Mother     Hypertension Mother     No Known Problems Father      Social History     Tobacco Use    Smoking status: Current Every Day Smoker     Packs/day: 0.50     Years: 10.00     Pack years: 5.00     Types: Cigarettes    Smokeless tobacco: Never Used   Substance Use Topics    Alcohol use: Yes     Comment: Occasional     Drug use: No     Review of Systems   Constitutional: Positive for fever.   HENT: Negative for sore throat.    Respiratory: Positive for cough. Negative for shortness of breath.    Cardiovascular: Positive for leg swelling. Negative for chest pain.   Gastrointestinal: Negative for diarrhea, nausea and vomiting.   Genitourinary: Negative for dysuria.   Musculoskeletal: Positive for myalgias. Negative for back pain.   Skin: Negative  for rash.   Neurological: Negative for weakness and headaches.   Psychiatric/Behavioral: Negative for behavioral problems.   All other systems reviewed and are negative.      Physical Exam     Initial Vitals   BP Pulse Resp Temp SpO2   08/11/19 2134 08/11/19 2133 08/11/19 2133 08/11/19 2133 08/11/19 2133   (!) 156/96 106 (!) 22 98.8 °F (37.1 °C) 96 %      MAP       --                Physical Exam    Nursing note and vitals reviewed.  Constitutional: He appears well-developed and well-nourished.   HENT:   Head: Normocephalic and atraumatic.   Right Ear: External ear normal.   Left Ear: External ear normal.   Clear nasal discharge, enlarged nasal turbinates, posterior oropharyngeal erythema without exudate or edema   Eyes: Conjunctivae and EOM are normal. Pupils are equal, round, and reactive to light.   Neck: Normal range of motion. Neck supple.   Cardiovascular: Normal rate and intact distal pulses.   Pulmonary/Chest: No respiratory distress.   Abdominal: Soft. Bowel sounds are normal.   Musculoskeletal: Normal range of motion.        Right lower leg: He exhibits edema.        Left lower leg: He exhibits tenderness and edema.        Legs:  Negative Marcie's sign.   Neurological: He is alert and oriented to person, place, and time.   Skin: Skin is warm and dry.   Hyperpigmentation to bilateral lower extremities. Venostasis dermatitis.    Psychiatric: He has a normal mood and affect. His behavior is normal.         ED Course   Procedures  Labs Reviewed - No data to display       Imaging Results           US Lower Extremity Veins Left (Final result)  Result time 08/11/19 22:36:07    Final result by Terrie Cantu MD (08/11/19 22:36:07)                 Impression:      No evidence of deep venous thrombosis in the left lower extremity.    Left inguinal adenopathy.    This report was flagged in Epic as abnormal.      Electronically signed by: Terrie Cantu  Date:    08/11/2019  Time:    22:36             Narrative:     EXAMINATION:  US LOWER EXTREMITY VEINS LEFT    CLINICAL HISTORY:  Pain in left leg    TECHNIQUE:  Duplex and color flow Doppler evaluation and graded compression of the left lower extremity veins was performed.    COMPARISON:  06/22/2019    FINDINGS:  Left thigh veins: The common femoral, femoral, popliteal, upper greater saphenous, and deep femoral veins are patent and free of thrombus. The veins are normally compressible and have normal phasic flow and augmentation response.    Left calf veins: The visualized calf veins are patent.    Contralateral CFV: The contralateral (right) common femoral vein is patent and free of thrombus.    Miscellaneous: There is a left inguinal adenopathy the largest lymph node measuring 5.9 x 2.8 x 3 cm.                                 Medical Decision Making:   History:   Old Medical Records: I decided to obtain old medical records.  Initial Assessment:   Jesús Salomon Jr. is a 34 y.o. male who presents to the ED complaining of left lower leg swelling/pain, cough, fever, and generalized body aches for the past three days.  Clinical Tests:   Radiological Study: Ordered and Reviewed  ED Management:  Ultrasound of left lower extremity reveals no evidence of DVT.  Patient was given instructions for leg pain and acute URI.  He was advised to follow up with primary care physician within the next 2 days for re-evaluation/return to the emergency department if condition worsens.            Scribe Attestation:   Scribe #1: I performed the above scribed service and the documentation accurately describes the services I performed. I attest to the accuracy of the note.    This document was produced by a scribe under my direction and in my presence. I agree with the content of the note and have made any necessary edits.     Dr. Dawkins    08/16/2019 3:23 AM             Clinical Impression:     1. Acute URI    2. Left leg pain    3. Leg edema, left            Disposition:   Disposition:  Discharged  Condition: Stable                        Anupam Dawkins MD  08/16/19 [id number]

## 2019-10-04 ENCOUNTER — HOSPITAL ENCOUNTER (EMERGENCY)
Facility: HOSPITAL | Age: 34
Discharge: HOME | End: 2019-10-04
Attending: INTERNAL MEDICINE
Payer: MEDICARE

## 2019-10-04 VITALS
TEMPERATURE: 98 F | HEART RATE: 87 BPM | OXYGEN SATURATION: 98 % | SYSTOLIC BLOOD PRESSURE: 147 MMHG | DIASTOLIC BLOOD PRESSURE: 88 MMHG | HEIGHT: 70 IN | WEIGHT: 315 LBS | RESPIRATION RATE: 18 BRPM | BODY MASS INDEX: 45.1 KG/M2

## 2019-10-04 DIAGNOSIS — M25.562 CHRONIC PAIN OF LEFT KNEE: Primary | ICD-10-CM

## 2019-10-04 DIAGNOSIS — G89.29 CHRONIC PAIN OF LEFT KNEE: Primary | ICD-10-CM

## 2019-10-04 PROCEDURE — 99283 EMERGENCY DEPT VISIT LOW MDM: CPT | Mod: ER

## 2019-10-04 RX ORDER — IBUPROFEN 800 MG/1
800 TABLET ORAL EVERY 8 HOURS PRN
Qty: 30 TABLET | Refills: 0 | OUTPATIENT
Start: 2019-10-04 | End: 2021-03-20

## 2019-10-04 NOTE — ED PROVIDER NOTES
Encounter Date: 10/4/2019       History     Chief Complaint   Patient presents with    Knee Pain     PT C/O LEFT KNEE PAIN SINCE TUESDAY, DENIES INJURY     34-year-old male presents to the emergency department complaining of left knee pain x2 days.  He denies recent trauma and states he has had problems with his left knee in the past.    The history is provided by the patient. No  was used.     Review of patient's allergies indicates:  No Known Allergies  Past Medical History:   Diagnosis Date    Allergy     Fever blister     Hypertension     Irregular heart beat     Joint pain     Left ACL tear 6/8/2015     Past Surgical History:   Procedure Laterality Date    KNEE SURGERY      TONSILLECTOMY       Family History   Problem Relation Age of Onset    Aneurysm Mother     Hypertension Mother     No Known Problems Father      Social History     Tobacco Use    Smoking status: Current Every Day Smoker     Packs/day: 0.50     Years: 10.00     Pack years: 5.00     Types: Cigarettes    Smokeless tobacco: Never Used   Substance Use Topics    Alcohol use: Yes     Comment: Occasional     Drug use: No     Review of Systems   Musculoskeletal: Positive for arthralgias.   All other systems reviewed and are negative.      Physical Exam     Initial Vitals [10/04/19 0157]   BP Pulse Resp Temp SpO2   (!) 153/95 98 (!) 22 98.2 °F (36.8 °C) 98 %      MAP       --         Physical Exam    Constitutional:   Obese   HENT:   Head: Normocephalic and atraumatic.   Right Ear: External ear normal.   Left Ear: External ear normal.   Eyes: EOM are normal.   Neck: Normal range of motion.   Cardiovascular: Intact distal pulses.   Pulmonary/Chest: Breath sounds normal. No respiratory distress.   Abdominal: Soft.   Musculoskeletal:   Left knee pain upon movement without gross deformity or tenderness to palpation.  Left lower extremity is neurovascularly intact   Neurological: He is alert.   Skin: Skin is warm and dry.    Psychiatric: He has a normal mood and affect.         ED Course   Procedures  Labs Reviewed - No data to display       Imaging Results    None          Medical Decision Making:   Initial Assessment:   34-year-old male presents to the emergency department complaining of left knee pain x2 days.  He denies recent trauma and states he has had problems with his left knee in the past.  ED Management:  Patient was given instructions for chronic knee pain and received a prescription for ibuprofen.  He was advised to follow up with his primary care physician within the next week for re-evaluation/return to the emergency department if condition worsens.                      Clinical Impression:       ICD-10-CM ICD-9-CM   1. Chronic pain of left knee M25.562 719.46    G89.29 338.29         Disposition:   Disposition: Discharged  Condition: Stable                        Anupam Dawkins MD  10/04/19 0212

## 2021-03-20 ENCOUNTER — HOSPITAL ENCOUNTER (EMERGENCY)
Facility: HOSPITAL | Age: 36
Discharge: HOME | End: 2021-03-20
Attending: EMERGENCY MEDICINE
Payer: COMMERCIAL

## 2021-03-20 VITALS
DIASTOLIC BLOOD PRESSURE: 94 MMHG | TEMPERATURE: 98 F | OXYGEN SATURATION: 98 % | RESPIRATION RATE: 18 BRPM | HEART RATE: 98 BPM | SYSTOLIC BLOOD PRESSURE: 170 MMHG | BODY MASS INDEX: 45.1 KG/M2 | WEIGHT: 315 LBS | HEIGHT: 70 IN

## 2021-03-20 DIAGNOSIS — M25.561 RIGHT KNEE PAIN: ICD-10-CM

## 2021-03-20 PROCEDURE — 63600175 PHARM REV CODE 636 W HCPCS: Mod: ER | Performed by: NURSE PRACTITIONER

## 2021-03-20 PROCEDURE — 99284 EMERGENCY DEPT VISIT MOD MDM: CPT | Mod: 25,ER

## 2021-03-20 PROCEDURE — 96372 THER/PROPH/DIAG INJ SC/IM: CPT | Mod: ER

## 2021-03-20 RX ORDER — SULINDAC 150 MG/1
150 TABLET ORAL 2 TIMES DAILY
Qty: 10 TABLET | Refills: 0 | Status: SHIPPED | OUTPATIENT
Start: 2021-03-20 | End: 2021-03-25

## 2021-03-20 RX ORDER — KETOROLAC TROMETHAMINE 30 MG/ML
10 INJECTION, SOLUTION INTRAMUSCULAR; INTRAVENOUS
Status: COMPLETED | OUTPATIENT
Start: 2021-03-20 | End: 2021-03-20

## 2021-03-20 RX ADMIN — KETOROLAC TROMETHAMINE 10 MG: 30 INJECTION, SOLUTION INTRAMUSCULAR; INTRAVENOUS at 01:03

## 2022-03-03 ENCOUNTER — HOSPITAL ENCOUNTER (OUTPATIENT)
Dept: PREADMISSION TESTING | Age: 37
Discharge: HOME | End: 2022-03-03
Attending: COLON & RECTAL SURGERY
Payer: COMMERCIAL

## 2022-03-03 ENCOUNTER — TRANSCRIBE ORDER (OUTPATIENT)
Dept: REGISTRATION | Age: 37
End: 2022-03-03

## 2022-03-03 DIAGNOSIS — U07.1 COVID-19: Primary | ICD-10-CM

## 2022-03-03 DIAGNOSIS — U07.1 COVID-19: ICD-10-CM

## 2022-03-03 LAB
SARS-COV-2, XPLCVT: NOT DETECTED
SOURCE, COVRS: NORMAL

## 2022-03-03 PROCEDURE — U0005 INFEC AGEN DETEC AMPLI PROBE: HCPCS

## 2022-03-04 RX ORDER — CARVEDILOL 12.5 MG/1
12.5 TABLET ORAL 2 TIMES DAILY WITH MEALS
COMMUNITY

## 2022-03-04 RX ORDER — BUSPIRONE HYDROCHLORIDE 10 MG/1
10 TABLET ORAL 2 TIMES DAILY
COMMUNITY

## 2022-03-04 RX ORDER — PANTOPRAZOLE SODIUM 40 MG/1
40 TABLET, DELAYED RELEASE ORAL DAILY
COMMUNITY

## 2022-03-04 RX ORDER — IBUPROFEN 400 MG/1
200 TABLET ORAL
COMMUNITY
End: 2022-03-08

## 2022-03-04 RX ORDER — CHOLECALCIFEROL (VITAMIN D3) 125 MCG
10 CAPSULE ORAL
COMMUNITY

## 2022-03-04 NOTE — PERIOP NOTES
LILI CAPPS COMPLETED. SPOKE WITH PATIENTS WIFE - TRISTIAN, GAVE HER PREOP INSTRUCTIONS PER ANESTHESIA PROTOCOL. ASKED HER IF ANY OTHER QUESTIONS TO CALL DR. Luong [facility], [phone] STATED PATIENT HAS BEEN COVID VACCINATED AND WILL BRING CARD WITH DOS.

## 2022-03-06 ENCOUNTER — HOSPITAL ENCOUNTER (EMERGENCY)
Facility: HOSPITAL | Age: 37
Discharge: HOME | End: 2022-03-06
Attending: EMERGENCY MEDICINE
Payer: MEDICARE

## 2022-03-06 VITALS
DIASTOLIC BLOOD PRESSURE: 97 MMHG | RESPIRATION RATE: 18 BRPM | TEMPERATURE: 98 F | SYSTOLIC BLOOD PRESSURE: 142 MMHG | OXYGEN SATURATION: 98 % | BODY MASS INDEX: 45.1 KG/M2 | WEIGHT: 315 LBS | HEIGHT: 70 IN | HEART RATE: 92 BPM

## 2022-03-06 DIAGNOSIS — L02.91 ABSCESS: Primary | ICD-10-CM

## 2022-03-06 LAB — POCT GLUCOSE: 133 MG/DL (ref 70–110)

## 2022-03-06 PROCEDURE — 87186 SC STD MICRODIL/AGAR DIL: CPT | Performed by: EMERGENCY MEDICINE

## 2022-03-06 PROCEDURE — 87070 CULTURE OTHR SPECIMN AEROBIC: CPT | Performed by: EMERGENCY MEDICINE

## 2022-03-06 PROCEDURE — 87077 CULTURE AEROBIC IDENTIFY: CPT | Performed by: EMERGENCY MEDICINE

## 2022-03-06 PROCEDURE — 99284 EMERGENCY DEPT VISIT MOD MDM: CPT | Mod: 25,ER

## 2022-03-06 PROCEDURE — 82962 GLUCOSE BLOOD TEST: CPT | Mod: ER

## 2022-03-06 PROCEDURE — 10060 I&D ABSCESS SIMPLE/SINGLE: CPT | Mod: ER

## 2022-03-06 RX ORDER — SULFAMETHOXAZOLE AND TRIMETHOPRIM 800; 160 MG/1; MG/1
2 TABLET ORAL 2 TIMES DAILY
Qty: 40 TABLET | Refills: 0 | Status: SHIPPED | OUTPATIENT
Start: 2022-03-06 | End: 2022-03-16

## 2022-03-06 RX ORDER — LIDOCAINE HYDROCHLORIDE 10 MG/ML
10 INJECTION INFILTRATION; PERINEURAL
Status: DISCONTINUED | OUTPATIENT
Start: 2022-03-06 | End: 2022-03-06

## 2022-03-06 RX ORDER — ACETAMINOPHEN 500 MG
500 TABLET ORAL EVERY 6 HOURS PRN
Qty: 30 TABLET | Refills: 0 | Status: SHIPPED | OUTPATIENT
Start: 2022-03-06

## 2022-03-06 RX ORDER — MUPIROCIN 20 MG/G
OINTMENT TOPICAL 3 TIMES DAILY
Qty: 60 G | Refills: 0 | Status: SHIPPED | OUTPATIENT
Start: 2022-03-06 | End: 2022-03-16

## 2022-03-06 RX ORDER — IBUPROFEN 600 MG/1
600 TABLET ORAL EVERY 6 HOURS PRN
Qty: 20 TABLET | Refills: 0 | Status: SHIPPED | OUTPATIENT
Start: 2022-03-06

## 2022-03-06 NOTE — ED PROVIDER NOTES
Encounter Date: 3/6/2022    SCRIBE #1 NOTE: I, Alem Breaux, am scribing for, and in the presence of,  Cammie Rodrigues DO. I have scribed the following portions of the note - Other sections scribed: HPI; ROS; PE.       History     Chief Complaint   Patient presents with    General Illness     Pt has a swollen hair follicle under his chin     Jesús Salomon Jr. is a 37 y.o. male with Hx of HTN who presents to the ED for chief complaint of swollen hair follicle under the chin onset 3 days ago. No known drug allergies. Patient has not attempted treatment. He denies dental pain. No pain at present. No further complaints at this present time.     The history is provided by the patient. No  was used.     Review of patient's allergies indicates:  No Known Allergies  Past Medical History:   Diagnosis Date    Allergy     Fever blister     Hypertension     Irregular heart beat     Joint pain     Left ACL tear 6/8/2015     Past Surgical History:   Procedure Laterality Date    KNEE SURGERY      TONSILLECTOMY       Family History   Problem Relation Age of Onset    Aneurysm Mother     Hypertension Mother     No Known Problems Father      Social History     Tobacco Use    Smoking status: Current Every Day Smoker     Packs/day: 0.50     Years: 10.00     Pack years: 5.00     Types: Cigarettes    Smokeless tobacco: Never Used   Substance Use Topics    Alcohol use: Yes     Comment: Occasional     Drug use: No     Review of Systems   Constitutional: Negative for chills and fever.   HENT: Negative for dental problem.    Respiratory: Negative for cough.    Cardiovascular: Negative for chest pain.   Gastrointestinal: Negative for abdominal pain, diarrhea, nausea and vomiting.   Genitourinary: Negative for dysuria.   Musculoskeletal: Negative for back pain.   Skin: Positive for wound.   Neurological: Negative for headaches.   All other systems reviewed and are negative.      Physical Exam     Initial  Vitals [03/06/22 1127]   BP Pulse Resp Temp SpO2   (!) 160/90 80 14 98.2 °F (36.8 °C) 97 %      MAP       --         Physical Exam    Nursing note and vitals reviewed.  Constitutional: He appears well-developed and well-nourished. He is Obese .   Morbidly obese.   HENT:   Head: Normocephalic and atraumatic.       Right Ear: External ear normal.   Left Ear: External ear normal.   Nose: Nose normal.   Mouth/Throat: Oropharynx is clear and moist.   Eyes: Conjunctivae and EOM are normal. Pupils are equal, round, and reactive to light.   Neck: Neck supple. JVD (bilateral) present.   Normal range of motion.  Cardiovascular: Normal rate, regular rhythm and normal heart sounds. Exam reveals no gallop and no friction rub.    No murmur heard.  Pulmonary/Chest: Breath sounds normal. No respiratory distress. He has no wheezes. He has no rhonchi. He has no rales.   Abdominal: Abdomen is soft. Bowel sounds are normal. There is no abdominal tenderness. There is no rebound and no guarding.   Musculoskeletal:         General: No tenderness or edema. Normal range of motion.      Cervical back: Normal range of motion and neck supple.     Neurological: He is alert and oriented to person, place, and time. No cranial nerve deficit.   Skin: Skin is warm and dry. Capillary refill takes less than 2 seconds. No rash noted.   Psychiatric: He has a normal mood and affect. His behavior is normal.         ED Course   I & D - Incision and Drainage    Date/Time: 3/6/2022 12:35 PM  Location procedure was performed: [facility] EMERGENCY DEPARTMENT  Performed by: Cammie Rodrigues DO  Authorized by: Cammie Rodrigues DO   Consent Done: Yes  Consent: Verbal consent obtained.  Consent given by: patient  Patient understanding: patient states understanding of the procedure being performed  Patient consent: the patient's understanding of the procedure matches consent given  Procedure consent: procedure consent matches procedure scheduled  Relevant documents: relevant  documents present and verified  Patient identity confirmed: verbally with patient  Type: cyst  Body area: head/neck  Scalpel size: 18 gauge needle.  Drainage: purulent  Drainage amount: moderate  Patient tolerance: Patient tolerated the procedure well with no immediate complications  Comments: Needle aspiration performed secondary to location; aerobic culture pending        Labs Reviewed   POCT GLUCOSE - Abnormal; Notable for the following components:       Result Value    POCT Glucose 133 (*)     All other components within normal limits   CULTURE, AEROBIC  (SPECIFY SOURCE)               Medications - No data to display  Medical Decision Making:   History:   Old Medical Records: I decided to obtain old medical records.  Clinical Tests:   Lab Tests: Ordered and Reviewed  Chief complaint:  swollen hair follicle under the chin onset 3 days ago.  Differential diagnosis: hyperglycemia, cellulitis, abscess, hair bump, folliculitis    Treatment in the ED: PE  Patient reports feeling better after treatment in the ER.      Discussed treatment, prescriptions, labs    Discharge home with sulfamethoxazole-trimethoprim 800-160mg (BACTRIM DS) 800-160 mg Tab   mupirocin (BACTROBAN) 2 % ointment   acetaminophen (TYLENOL) 500 MG tablet   ibuprofen (ADVIL,MOTRIN) 600 MG tablet     Fill and take prescriptions as directed.  Return to the ED if symptoms worsen or do not resolve.   Answered questions and discussed discharge plan.    Patient feels better and is ready for discharge.  Follow up with PCP/specialist in 1 day.        Scribe Attestation:   Scribe #1: I performed the above scribed service and the documentation accurately describes the services I performed. I attest to the accuracy of the note.                I, Dr. Cammie Rodrigues, personally performed the services described in this documentation. This document was produced by a scribe under my direction and in my presence. All medical record entries made by the scribe were  at my direction and in my presence.  I have reviewed the chart and agree that the record reflects my personal performance and is accurate and complete. Cammie Rodrigues DO.     03/06/2022 4:11 PM    Clinical Impression:   Final diagnoses:  [L02.91] Abscess (Primary)          ED Disposition Condition    Discharge Stable        ED Prescriptions     Medication Sig Dispense Start Date End Date Auth. Provider    ibuprofen (ADVIL,MOTRIN) 600 MG tablet Take 1 tablet (600 mg total) by mouth every 6 (six) hours as needed for Pain (Take with food as needed for mild-to-moderate pain). 20 tablet 3/6/2022  Cammie Rodrigues DO    acetaminophen (TYLENOL) 500 MG tablet Take 1 tablet (500 mg total) by mouth every 6 (six) hours as needed for Pain. 30 tablet 3/6/2022  Cammie Rodrigues DO    mupirocin (BACTROBAN) 2 % ointment Apply topically 3 (three) times daily. Apply to wound 3 times a day for 10 days for 10 days 60 g 3/6/2022 3/16/2022 Cammie Rodrigues DO    sulfamethoxazole-trimethoprim 800-160mg (BACTRIM DS) 800-160 mg Tab Take 2 tablets by mouth 2 (two) times daily. for 10 days 40 tablet 3/6/2022 3/16/2022 Cammie Rodrigues DO        Follow-up Information     Follow up With Specialties Details Why Contact Info    [place] MD Family Medicine Schedule an appointment as soon as possible for a visit in 2 days For wound re-check [address] [facility] [address] [phone]             Cammie Rodrigues DO  03/06/22 2819

## 2022-03-06 NOTE — Clinical Note
"Jesús Zavala" Aime was seen and treated in our emergency department on 3/6/2022.  He may return to work on 03/08/2022.       If you have any questions or concerns, please don't hesitate to call.      Cammie Rodrigues, DO"

## 2022-03-08 ENCOUNTER — ANESTHESIA EVENT (OUTPATIENT)
Dept: SURGERY | Age: 37
End: 2022-03-08
Payer: COMMERCIAL

## 2022-03-08 ENCOUNTER — ANESTHESIA (OUTPATIENT)
Dept: SURGERY | Age: 37
End: 2022-03-08
Payer: COMMERCIAL

## 2022-03-08 ENCOUNTER — HOSPITAL ENCOUNTER (OUTPATIENT)
Age: 37
Setting detail: OUTPATIENT SURGERY
Discharge: HOME | End: 2022-03-08
Attending: COLON & RECTAL SURGERY | Admitting: COLON & RECTAL SURGERY
Payer: COMMERCIAL

## 2022-03-08 VITALS
WEIGHT: 168 LBS | HEIGHT: 67 IN | BODY MASS INDEX: 26.37 KG/M2 | DIASTOLIC BLOOD PRESSURE: 89 MMHG | HEART RATE: 59 BPM | OXYGEN SATURATION: 98 % | TEMPERATURE: 97.8 F | RESPIRATION RATE: 12 BRPM | SYSTOLIC BLOOD PRESSURE: 133 MMHG

## 2022-03-08 DIAGNOSIS — G89.18 ACUTE POST-OPERATIVE PAIN: Primary | ICD-10-CM

## 2022-03-08 PROCEDURE — 76210000016 HC OR PH I REC 1 TO 1.5 HR: Performed by: COLON & RECTAL SURGERY

## 2022-03-08 PROCEDURE — 51798 US URINE CAPACITY MEASURE: CPT

## 2022-03-08 PROCEDURE — 77030042556 HC PNCL CAUT -B: Performed by: COLON & RECTAL SURGERY

## 2022-03-08 PROCEDURE — 74011250636 HC RX REV CODE- 250/636: Performed by: ANESTHESIOLOGY

## 2022-03-08 PROCEDURE — 74011250637 HC RX REV CODE- 250/637: Performed by: COLON & RECTAL SURGERY

## 2022-03-08 PROCEDURE — 74011250636 HC RX REV CODE- 250/636: Performed by: NURSE ANESTHETIST, CERTIFIED REGISTERED

## 2022-03-08 PROCEDURE — 76060000033 HC ANESTHESIA 1 TO 1.5 HR: Performed by: COLON & RECTAL SURGERY

## 2022-03-08 PROCEDURE — 74011000250 HC RX REV CODE- 250: Performed by: NURSE ANESTHETIST, CERTIFIED REGISTERED

## 2022-03-08 PROCEDURE — 77030008477 HC STYL SATN SLP COVD -A: Performed by: ANESTHESIOLOGY

## 2022-03-08 PROCEDURE — 2709999900 HC NON-CHARGEABLE SUPPLY

## 2022-03-08 PROCEDURE — 2709999900 HC NON-CHARGEABLE SUPPLY: Performed by: COLON & RECTAL SURGERY

## 2022-03-08 PROCEDURE — 77030040922 HC BLNKT HYPOTHRM STRY -A

## 2022-03-08 PROCEDURE — 76010000149 HC OR TIME 1 TO 1.5 HR: Performed by: COLON & RECTAL SURGERY

## 2022-03-08 PROCEDURE — 77030040361 HC SLV COMPR DVT MDII -B: Performed by: COLON & RECTAL SURGERY

## 2022-03-08 PROCEDURE — 77030008684 HC TU ET CUF COVD -B: Performed by: ANESTHESIOLOGY

## 2022-03-08 PROCEDURE — 88342 IMHCHEM/IMCYTCHM 1ST ANTB: CPT

## 2022-03-08 PROCEDURE — 76210000020 HC REC RM PH II FIRST 0.5 HR: Performed by: COLON & RECTAL SURGERY

## 2022-03-08 PROCEDURE — 88304 TISSUE EXAM BY PATHOLOGIST: CPT

## 2022-03-08 PROCEDURE — 74011000250 HC RX REV CODE- 250: Performed by: COLON & RECTAL SURGERY

## 2022-03-08 PROCEDURE — 74011250637 HC RX REV CODE- 250/637: Performed by: ANESTHESIOLOGY

## 2022-03-08 PROCEDURE — 77030031139 HC SUT VCRL2 J&J -A: Performed by: COLON & RECTAL SURGERY

## 2022-03-08 RX ORDER — MORPHINE SULFATE 2 MG/ML
2 INJECTION, SOLUTION INTRAMUSCULAR; INTRAVENOUS
Status: DISCONTINUED | OUTPATIENT
Start: 2022-03-08 | End: 2022-03-08 | Stop reason: HOSPADM

## 2022-03-08 RX ORDER — SODIUM CHLORIDE 0.9 % (FLUSH) 0.9 %
5-40 SYRINGE (ML) INJECTION EVERY 8 HOURS
Status: DISCONTINUED | OUTPATIENT
Start: 2022-03-08 | End: 2022-03-08 | Stop reason: HOSPADM

## 2022-03-08 RX ORDER — ONDANSETRON 2 MG/ML
INJECTION INTRAMUSCULAR; INTRAVENOUS AS NEEDED
Status: DISCONTINUED | OUTPATIENT
Start: 2022-03-08 | End: 2022-03-08 | Stop reason: HOSPADM

## 2022-03-08 RX ORDER — FENTANYL CITRATE 50 UG/ML
50 INJECTION, SOLUTION INTRAMUSCULAR; INTRAVENOUS AS NEEDED
Status: DISCONTINUED | OUTPATIENT
Start: 2022-03-08 | End: 2022-03-08 | Stop reason: HOSPADM

## 2022-03-08 RX ORDER — PHENYLEPHRINE HCL IN 0.9% NACL 0.4MG/10ML
SYRINGE (ML) INTRAVENOUS AS NEEDED
Status: DISCONTINUED | OUTPATIENT
Start: 2022-03-08 | End: 2022-03-08 | Stop reason: HOSPADM

## 2022-03-08 RX ORDER — SODIUM CHLORIDE 0.9 % (FLUSH) 0.9 %
5-40 SYRINGE (ML) INJECTION AS NEEDED
Status: DISCONTINUED | OUTPATIENT
Start: 2022-03-08 | End: 2022-03-08 | Stop reason: HOSPADM

## 2022-03-08 RX ORDER — OXYCODONE HYDROCHLORIDE 5 MG/1
5 TABLET ORAL ONCE
Status: COMPLETED | OUTPATIENT
Start: 2022-03-08 | End: 2022-03-08

## 2022-03-08 RX ORDER — OXYCODONE HYDROCHLORIDE 5 MG/1
5-10 TABLET ORAL
Qty: 40 TABLET | Refills: 0 | Status: SHIPPED | OUTPATIENT
Start: 2022-03-08 | End: 2022-03-15

## 2022-03-08 RX ORDER — SODIUM CHLORIDE 9 MG/ML
25 INJECTION, SOLUTION INTRAVENOUS CONTINUOUS
Status: DISCONTINUED | OUTPATIENT
Start: 2022-03-08 | End: 2022-03-08 | Stop reason: HOSPADM

## 2022-03-08 RX ORDER — DIPHENHYDRAMINE HYDROCHLORIDE 50 MG/ML
12.5 INJECTION, SOLUTION INTRAMUSCULAR; INTRAVENOUS AS NEEDED
Status: DISCONTINUED | OUTPATIENT
Start: 2022-03-08 | End: 2022-03-08 | Stop reason: HOSPADM

## 2022-03-08 RX ORDER — MIDAZOLAM HYDROCHLORIDE 1 MG/ML
0.5 INJECTION, SOLUTION INTRAMUSCULAR; INTRAVENOUS
Status: DISCONTINUED | OUTPATIENT
Start: 2022-03-08 | End: 2022-03-08 | Stop reason: HOSPADM

## 2022-03-08 RX ORDER — SODIUM CHLORIDE, SODIUM LACTATE, POTASSIUM CHLORIDE, CALCIUM CHLORIDE 600; 310; 30; 20 MG/100ML; MG/100ML; MG/100ML; MG/100ML
100 INJECTION, SOLUTION INTRAVENOUS CONTINUOUS
Status: DISCONTINUED | OUTPATIENT
Start: 2022-03-08 | End: 2022-03-08 | Stop reason: HOSPADM

## 2022-03-08 RX ORDER — MIDAZOLAM HYDROCHLORIDE 1 MG/ML
1 INJECTION, SOLUTION INTRAMUSCULAR; INTRAVENOUS AS NEEDED
Status: DISCONTINUED | OUTPATIENT
Start: 2022-03-08 | End: 2022-03-08 | Stop reason: HOSPADM

## 2022-03-08 RX ORDER — DEXMEDETOMIDINE HYDROCHLORIDE 100 UG/ML
INJECTION, SOLUTION INTRAVENOUS AS NEEDED
Status: DISCONTINUED | OUTPATIENT
Start: 2022-03-08 | End: 2022-03-08 | Stop reason: HOSPADM

## 2022-03-08 RX ORDER — SODIUM CHLORIDE, SODIUM LACTATE, POTASSIUM CHLORIDE, CALCIUM CHLORIDE 600; 310; 30; 20 MG/100ML; MG/100ML; MG/100ML; MG/100ML
INJECTION, SOLUTION INTRAVENOUS
Status: DISCONTINUED | OUTPATIENT
Start: 2022-03-08 | End: 2022-03-08 | Stop reason: HOSPADM

## 2022-03-08 RX ORDER — ROCURONIUM BROMIDE 10 MG/ML
INJECTION, SOLUTION INTRAVENOUS AS NEEDED
Status: DISCONTINUED | OUTPATIENT
Start: 2022-03-08 | End: 2022-03-08 | Stop reason: HOSPADM

## 2022-03-08 RX ORDER — LIDOCAINE HYDROCHLORIDE 10 MG/ML
0.1 INJECTION, SOLUTION EPIDURAL; INFILTRATION; INTRACAUDAL; PERINEURAL AS NEEDED
Status: DISCONTINUED | OUTPATIENT
Start: 2022-03-08 | End: 2022-03-08 | Stop reason: HOSPADM

## 2022-03-08 RX ORDER — ONDANSETRON 2 MG/ML
4 INJECTION INTRAMUSCULAR; INTRAVENOUS AS NEEDED
Status: DISCONTINUED | OUTPATIENT
Start: 2022-03-08 | End: 2022-03-08 | Stop reason: HOSPADM

## 2022-03-08 RX ORDER — ACETAMINOPHEN 325 MG/1
650 TABLET ORAL ONCE
Status: COMPLETED | OUTPATIENT
Start: 2022-03-08 | End: 2022-03-08

## 2022-03-08 RX ORDER — FENTANYL CITRATE 50 UG/ML
INJECTION, SOLUTION INTRAMUSCULAR; INTRAVENOUS AS NEEDED
Status: DISCONTINUED | OUTPATIENT
Start: 2022-03-08 | End: 2022-03-08 | Stop reason: HOSPADM

## 2022-03-08 RX ORDER — BUPIVACAINE HYDROCHLORIDE AND EPINEPHRINE 2.5; 5 MG/ML; UG/ML
30 INJECTION, SOLUTION EPIDURAL; INFILTRATION; INTRACAUDAL; PERINEURAL ONCE
Status: COMPLETED | OUTPATIENT
Start: 2022-03-08 | End: 2022-03-08

## 2022-03-08 RX ORDER — FENTANYL CITRATE 50 UG/ML
25 INJECTION, SOLUTION INTRAMUSCULAR; INTRAVENOUS
Status: DISCONTINUED | OUTPATIENT
Start: 2022-03-08 | End: 2022-03-08 | Stop reason: HOSPADM

## 2022-03-08 RX ORDER — PROPOFOL 10 MG/ML
INJECTION, EMULSION INTRAVENOUS AS NEEDED
Status: DISCONTINUED | OUTPATIENT
Start: 2022-03-08 | End: 2022-03-08 | Stop reason: HOSPADM

## 2022-03-08 RX ORDER — LIDOCAINE HYDROCHLORIDE 20 MG/ML
INJECTION, SOLUTION EPIDURAL; INFILTRATION; INTRACAUDAL; PERINEURAL AS NEEDED
Status: DISCONTINUED | OUTPATIENT
Start: 2022-03-08 | End: 2022-03-08 | Stop reason: HOSPADM

## 2022-03-08 RX ORDER — HYDROMORPHONE HYDROCHLORIDE 1 MG/ML
0.5 INJECTION, SOLUTION INTRAMUSCULAR; INTRAVENOUS; SUBCUTANEOUS
Status: DISCONTINUED | OUTPATIENT
Start: 2022-03-08 | End: 2022-03-08 | Stop reason: HOSPADM

## 2022-03-08 RX ORDER — ROPIVACAINE HYDROCHLORIDE 5 MG/ML
30 INJECTION, SOLUTION EPIDURAL; INFILTRATION; PERINEURAL AS NEEDED
Status: DISCONTINUED | OUTPATIENT
Start: 2022-03-08 | End: 2022-03-08 | Stop reason: HOSPADM

## 2022-03-08 RX ORDER — SUCCINYLCHOLINE CHLORIDE 20 MG/ML
INJECTION INTRAMUSCULAR; INTRAVENOUS AS NEEDED
Status: DISCONTINUED | OUTPATIENT
Start: 2022-03-08 | End: 2022-03-08 | Stop reason: HOSPADM

## 2022-03-08 RX ORDER — MIDAZOLAM HYDROCHLORIDE 1 MG/ML
INJECTION, SOLUTION INTRAMUSCULAR; INTRAVENOUS AS NEEDED
Status: DISCONTINUED | OUTPATIENT
Start: 2022-03-08 | End: 2022-03-08 | Stop reason: HOSPADM

## 2022-03-08 RX ADMIN — OXYCODONE 5 MG: 5 TABLET ORAL at 13:25

## 2022-03-08 RX ADMIN — PROPOFOL 200 MG: 10 INJECTION, EMULSION INTRAVENOUS at 11:33

## 2022-03-08 RX ADMIN — Medication 40 MCG: at 12:30

## 2022-03-08 RX ADMIN — SODIUM CHLORIDE, POTASSIUM CHLORIDE, SODIUM LACTATE AND CALCIUM CHLORIDE: 600; 310; 30; 20 INJECTION, SOLUTION INTRAVENOUS at 11:27

## 2022-03-08 RX ADMIN — ONDANSETRON HYDROCHLORIDE 4 MG: 2 SOLUTION INTRAMUSCULAR; INTRAVENOUS at 13:06

## 2022-03-08 RX ADMIN — LIDOCAINE HYDROCHLORIDE 80 MG: 20 INJECTION, SOLUTION EPIDURAL; INFILTRATION; INTRACAUDAL; PERINEURAL at 11:33

## 2022-03-08 RX ADMIN — DEXMEDETOMIDINE HYDROCHLORIDE 4 MCG: 100 INJECTION, SOLUTION, CONCENTRATE INTRAVENOUS at 12:10

## 2022-03-08 RX ADMIN — ACETAMINOPHEN 650 MG: 325 TABLET ORAL at 11:06

## 2022-03-08 RX ADMIN — SODIUM CHLORIDE, POTASSIUM CHLORIDE, SODIUM LACTATE AND CALCIUM CHLORIDE 100 ML/HR: 600; 310; 30; 20 INJECTION, SOLUTION INTRAVENOUS at 11:07

## 2022-03-08 RX ADMIN — SUCCINYLCHOLINE CHLORIDE 120 MG: 20 INJECTION, SOLUTION INTRAMUSCULAR; INTRAVENOUS at 11:34

## 2022-03-08 RX ADMIN — ONDANSETRON HYDROCHLORIDE 4 MG: 2 INJECTION, SOLUTION INTRAMUSCULAR; INTRAVENOUS at 12:27

## 2022-03-08 RX ADMIN — MIDAZOLAM 2 MG: 1 INJECTION INTRAMUSCULAR; INTRAVENOUS at 11:26

## 2022-03-08 RX ADMIN — SUGAMMADEX 200 MG: 100 INJECTION, SOLUTION INTRAVENOUS at 12:36

## 2022-03-08 RX ADMIN — DEXMEDETOMIDINE HYDROCHLORIDE 4 MCG: 100 INJECTION, SOLUTION, CONCENTRATE INTRAVENOUS at 12:23

## 2022-03-08 RX ADMIN — FENTANYL CITRATE 50 MCG: 50 INJECTION, SOLUTION INTRAMUSCULAR; INTRAVENOUS at 11:45

## 2022-03-08 RX ADMIN — ROCURONIUM BROMIDE 5 MG: 10 SOLUTION INTRAVENOUS at 11:33

## 2022-03-08 RX ADMIN — FENTANYL CITRATE 50 MCG: 50 INJECTION, SOLUTION INTRAMUSCULAR; INTRAVENOUS at 11:33

## 2022-03-08 NOTE — BRIEF OP NOTE
Brief Postoperative Note    Patient: Thania Cesar  YOB: 1985  MRN: [id number]    Date of Procedure: 3/8/2022     Pre-Op Diagnosis:  Hemorrhoids  Post-Op Diagnosis:  Hemorrhoids    Procedure:  Rigid proctosigmoidoscopy and extensive internal and external hemorrhoidectomies  Surgeon:  Ismael Camargo MD  Surgical Assistant:  Philly Starkey SA  Anesthesia: General endotracheal  Specimens:   ID Type Source Tests Collected by Time Destination   Hemorrhoids Fresh Tissue  Kailash Nova MD 3/8/2022 1227 Pathology   Drains:  None  Estimated Blood Loss: 50 mL  Crystalloid:  158 mL  Complications:  None apparent  Implants:  None  Findings:  Significantly enlarged internal and external hemorrhoids in the right lateral, right posterior, left posterior, left lateral, and left anterior positions. Otherwise normal rigid proctosigmoidoscopy to 21 cm from the anal verge.       Electronically Signed by Ismael Camargo MD

## 2022-03-08 NOTE — ANESTHESIA PREPROCEDURE EVALUATION
Relevant Problems   No relevant active problems       Anesthetic History   No history of anesthetic complications            Review of Systems / Medical History  Patient summary reviewed, nursing notes reviewed and pertinent labs reviewed    Pulmonary  Within defined limits                 Neuro/Psych   Within defined limits      Psychiatric history     Cardiovascular  Within defined limits  Hypertension              Exercise tolerance: >4 METS     GI/Hepatic/Renal  Within defined limits   GERD           Endo/Other  Within defined limits           Other Findings              Physical Exam    Airway  Mallampati: II  TM Distance: > 6 cm  Neck ROM: normal range of motion   Mouth opening: Normal     Cardiovascular  Regular rate and rhythm,  S1 and S2 normal,  no murmur, click, rub, or gallop             Dental  No notable dental hx       Pulmonary  Breath sounds clear to auscultation               Abdominal  GI exam deferred       Other Findings            Anesthetic Plan    ASA: 2  Anesthesia type: general    Monitoring Plan: BIS      Induction: Intravenous  Anesthetic plan and risks discussed with: Patient

## 2022-03-08 NOTE — H&P
History and Physical (outpatient)    Patient: Oskar Velazquez 30 y.o. male     Chief Complaint:  Hemorrhoids    History of Present Illness: The patient is a 44-year-old male who reports it has had hemorrhoid problems inside and out for a period of years. He experiences this is severe pain after bowel movements but does not usually bleed. He usually moves his bowels once per day but sometimes twice per day. He denies experiencing any fecal incontinence. Examination in the office on 2/11/2022 revealed enlarged external hemorrhoids that were most prominent on the right side. Right and left-sided external hemorrhoids became more engorged with blood with straining in the left lateral decubitus position. Digital rectal examination revealed grossly normal resting sphincter tone and no masses, and anoscopy revealed significant internal hemorrhoidal enlargement in multiple areas.       History:  Past Medical History:   Diagnosis Date    Anxiety     COVID-19 vaccine series completed     Moderna dose #1 received on 4/25/2021; dose #2 received on 5/23/21    GERD (gastroesophageal reflux disease)     Hypertension     Ill-defined condition 2022    ANXIETY INDUCED HYPERTENSION        Past Surgical History:   Procedure Laterality Date    HX UROLOGICAL      Surgery for undescended testicle       Family History   Problem Relation Age of Onset    Epilepsy Mother     Depression Mother    [facility] Anxiety Mother     Bipolar Disorder Mother     Hypertension Father     Macular Degen Paternal Grandmother     Diabetes Paternal Grandfather     Heart Disease Paternal Grandfather     Anesth Problems Neg Hx      Social History     Socioeconomic History    Marital status:      Spouse name: Not on file    Number of children: Not on file    Years of education: Not on file    Highest education level: Not on file   Occupational History    Not on file   Tobacco Use    Smoking status: Former Smoker     Packs/day: 1.00     Years: 20.00     Pack years: 20.00     Quit date: 3/4/1995     Years since quittin.0    Smokeless tobacco: Current User    Tobacco comment: DIPS - SMOKELESS TOBACCO    Substance and Sexual Activity    Alcohol use: No    Drug use: No    Sexual activity: Yes     Partners: Female   Other Topics Concern    Not on file   Social History Narrative    Not on file     Social Determinants of Health     Financial Resource Strain:     Difficulty of Paying Living Expenses: Not on file   Food Insecurity:     Worried About Running Out of Food in the Last Year: Not on file    Felicia of Food in the Last Year: Not on file   Transportation Needs:     Lack of Transportation (Medical): Not on file    Lack of Transportation (Non-Medical): Not on file   Physical Activity:     Days of Exercise per Week: Not on file    Minutes of Exercise per Session: Not on file   Stress:     Feeling of Stress : Not on file   Social Connections:     Frequency of Communication with Friends and Family: Not on file    Frequency of Social Gatherings with Friends and Family: Not on file    Attends Confucianist Services: Not on file    Active Member of [address] [place] [address] or Organizations: Not on file    Attends Club or Organization Meetings: Not on file    Marital Status: Not on file   Intimate Partner Violence:     Fear of Current or Ex-Partner: Not on file    Emotionally Abused: Not on file    Physically Abused: Not on file    Sexually Abused: Not on file   Housing Stability:     Unable to Pay for Housing in the Last Year: Not on file    Number of [place] in the Last Year: Not on file    Unstable Housing in the Last Year: Not on file       Allergies: Allergies   Allergen Reactions    Reglan [Metoclopramide] Anxiety     SEVERE ANXIETY       Current Medications:  Cannot display prior to admission medications because the patient has not been admitted in this contact. No current facility-administered medications for this encounter. Current Outpatient Medications   Medication Sig    vortioxetine (Trintellix) 20 mg tablet Take 20 mg by mouth daily.  pantoprazole (PROTONIX) 40 mg tablet Take 40 mg by mouth daily.  carvediloL (Coreg) 12.5 mg tablet Take 12.5 mg by mouth two (2) times daily (with meals).  busPIRone (BUSPAR) 10 mg tablet Take 10 mg by mouth two (2) times a day.  OTHER 3 fiber chews every bedtime    ibuprofen (MOTRIN) 400 mg tablet Take 200 mg by mouth every six (6) hours as needed for Pain.  melatonin 5 mg tablet Take 10 mg by mouth nightly as needed. Physical Exam:  Height 5' 7\" (1.702 m), weight 76.3 kg (168 lb 3.4 oz). GENERAL:  No apparent distress. LUNGS:  Clear to auscultation bilaterally. HEART:  Regular rate and rhythm with no murmurs, gallops, or rubs. NEUROLOGIC: Alert and oriented. No gross deficits. Alerts:      Laboratory:    No results for input(s): HGB, HCT, WBC, PLT, INR, BUN, CREA, K, CRCLT, HGBEXT, HCTEXT, PLTEXT, INREXT in the last 72 hours. No lab exists for component: PTT, PT    Assessment and Plan:  The hemorrhoids are symptomatic, and surgical hemorrhoidectomy is indicated. Rigid proctosigmoidoscopy is also indicated to fully evaluate the rectum for other significant pathology. The preparation for, risks of and expected recovery from the operation were discussed in detail, and the patient has agreed to proceed.

## 2022-03-08 NOTE — ANESTHESIA POSTPROCEDURE EVALUATION
Procedure(s):  RIDGID PROCTO SIGMOIDOSCOPY AND HEMORRHOIDECTOMY. general    Anesthesia Post Evaluation        Patient location during evaluation: PACU  Note status: Adequate. Level of consciousness: responsive to verbal stimuli and sleepy but conscious  Pain management: satisfactory to patient  Airway patency: patent  Anesthetic complications: no  Cardiovascular status: acceptable  Respiratory status: acceptable  Hydration status: acceptable  Comments: +Post-Anesthesia Evaluation and Assessment    Patient: Mookie Souza MRN: [id number]  SSN: [id number]   YOB: 1985  Age: 30 y.o. Sex: male          Cardiovascular Function/Vital Signs    /82   Pulse (!) 56   Temp (P) 36.7 °C (98 °F)   Resp 17   Ht 5' 7\" (1.702 m)   Wt 76.2 kg (168 lb)   SpO2 98%   BMI 26.31 kg/m²     Patient is status post Procedure(s):  RIDGID PROCTO SIGMOIDOSCOPY AND HEMORRHOIDECTOMY. Nausea/Vomiting: Controlled. Postoperative hydration reviewed and adequate. Pain:  Pain Scale 1: Numeric (0 - 10) (03/08/22 1325)  Pain Intensity 1: 3 (03/08/22 1325)   Managed. Neurological Status:   Neuro (WDL): (P) Within Defined Limits (03/08/22 1330)   At baseline. Mental Status and Level of Consciousness: Arousable. Pulmonary Status:   O2 Device: None (Room air) (03/08/22 1320)   Adequate oxygenation and airway patent. Complications related to anesthesia: None    Post-anesthesia assessment completed. No concerns. I have evaluated the patient and the patient is stable and ready to be discharged from PACU . Signed By: Marieta Goodpasture, MD    3/8/2022        INITIAL Post-op Vital signs:   Vitals Value Taken Time   /85 03/08/22 1345   Temp 36.6 °C (97.8 °F) 03/08/22 1251   Pulse 61 03/08/22 1353   Resp 16 03/08/22 1353   SpO2 98 % 03/08/22 1353   Vitals shown include unvalidated device data.

## 2022-03-08 NOTE — ROUTINE PROCESS
Patient: Shamir Gonzalez MRN: [id number]  SSN: [id number]   YOB: 1985  Age: 30 y.o. Sex: male     Patient is status post Procedure(s):  RIDGID PROCTO SIGMOIDOSCOPY AND HEMORRHOIDECTOMY.     Surgeon(s) and Role:     * Alena Park MD - Primary    Local/Dose/Irrigation: 20ml 0.25% marcaine with epi total injected                Peripheral IV 03/08/22 Anterior;Left;Proximal Forearm (Active)   Site Assessment Clean, dry, & intact 03/08/22 1053   Phlebitis Assessment 0 03/08/22 1053   Infiltration Assessment 0 03/08/22 1053   Dressing Status Clean, dry, & intact 03/08/22 1053   Dressing Type Transparent;Tape 03/08/22 1053   Hub Color/Line Status Infusing;Pink 03/08/22 1053            Airway - Endotracheal Tube 03/08/22 Oral (Active)                   Dressing/Packing:  Incision 03/08/22 Rectum-Dressing/Treatment:  (SURGIFOAM, 4X4'S, MEDIPORE TAPE) (03/08/22 1100)

## 2022-03-08 NOTE — DISCHARGE INSTRUCTIONS
Post-Operative Instructions      1. Diet:  Consume a high fiber diet as tolerated. Such a diet may include fresh fruits, vegetables, and whole grain cereals and breads. You should also drink 8-10 glasses of water, juice, or other non-alcoholic beverages per day. 2. Fiber Supplements:  Take 1 dose of Metamucil or other over-the-counter fiber supplement (Citrucel, BeneFiber, etc.) as directed each morning and another dose each evening. 3. Medications: Take prescription pain medication (oxycodone) as prescribed. Do not drive, drink alcohol, or operate machinery while taking the oxycodone. Take docusate (non-prescription stool softener) twice per day as directed. Beginning today, take maximum doses of Tylenol (1000 mg every 6 hours) until you do not need pain medication anymore. Doing so will help you to manage the pain and to use less of the oxycodone. Beginning on the second day after surgery, you may take ibuprofen as directed in addition to or instead of the prescription medication if there has been no significant bleeding. Do not take pain medication on an empty stomach. Do not take aspirin or aspirin-containing products for 10 days following the procedure. 4. Activity:  Do not engage in any heavy lifting or other strenuous activity until you have been seen for follow-up. You may walk as much as you want, and you may climb stairs. Frequent walking will help prevent constipation. 5. Sitz Baths (soaking):  Remove the dressing on the first day after surgery or just before your next bowel movement (whichever comes first), then begin performing sitz baths 3 times per day and after bowel movements. The water should be very warm, and you should soak for no longer than 20 minutes at a time. Do not wipe the anal area with dry toilet tissue; instead, use baby wipes. After sitz baths, cover the anal area with a gauze dressing.   You may also apply Neosporin, Neosporin + Pain Relief, or a non-prescription topical anesthetic such as lidocaine or Nupercainal ointment. 6. Constipation:  If you have not had a bowel movement by sometime on Thursday 3/10/2022, take 1 Tablespoon of Milk of Magnesia. Repeat in 6 hours if you have no results. If taking the second dose of the Milk of Magnesia does not result in a bowel movement, take two to four 5 mg Dulcolax (bisacodyl) tablets. If you have not had a bowel movement by the next morning after taking the bisacodyl, call my office. 7. Bleeding:  A small amount of bright red bleeding can be expected for the next several days. If bleeding appears to be continuous, call my office. 8. Other Problems:  If you experience intolerable pain, fever (temperature of 101 or more), or inability to urinate, call my office. 9. Questions: If you have any questions about your post-operative condition or care, do not hesitate to call my office. 10. Work:  You may return to work in two weeks. 11.  Other: For anything other than scheduling, please call [phone]. 12.  Follow-up:  Please return to my office at 9:30 AM on Tuesday 3/22/2022. If you need to change the appointment, please call [phone] on a weekday between 9:00 AM and noon. Richar Muhammad M.D.  [phone]      ______________________________________________________________________    Anesthesia Discharge Instructions    After general anesthesia or intervenous sedation, for 24 hours or while taking prescription Narcotics:  · Limit your activities  · Do not drive or operate hazardous machinery  · If you have not urinated within 8 hours after discharge, please contact your surgeon on call.   · Do not make important personal or business decisions  · Do not drink alcoholic beverages    Report the following to your surgeon:  · Excessive pain, swelling, redness or odor of or around the surgical area  · Temperature over 100.5 degrees  · Nausea and vomiting lasting longer than 4 hours or if unable to take medication  · Any signs of decreased circulation or nerve impairment to extremity:  Change in color, persistent numbness, tingling, coldness or increased pain.   · Any questions  Took Tylenol at 11:06 am. May take next dose after 3pm.  Took Oxycodone at 1:25 pm

## 2022-03-09 LAB — BACTERIA SPEC AEROBE CULT: ABNORMAL

## 2022-03-09 NOTE — OP NOTES
[address]   OPERATIVE REPORT    Name:  Alex Cazares  MR#:  [id number]  :  1985  ACCOUNT #:  [de-identified]    DATE OF SERVICE:  2022    PREOPERATIVE DIAGNOSIS:  Hemorrhoids. POSTOPERATIVE DIAGNOSIS:  Hemorrhoids. PROCEDURE PERFORMED:  Rigid proctosigmoidoscopy and extensive internal and external hemorrhoidectomies. SURGEON:  Harpal Farmer MD    ASSISTANT:  Nidia Chowdhury SA    ANESTHESIA:  General endotracheal    SPECIMENS REMOVED:  Hemorrhoids    TUBES AND DRAINS:  None    ESTIMATED BLOOD LOSS:  50 mL    INTRAVENOUS FLUIDS:  Crystalloid 336 mL    COMPLICATIONS:  None apparent    IMPLANTS:  None    INDICATION FOR PROCEDURE:  The patient is a 79-year-old male who reports that he has had hemorrhoid problems inside and out for a period of years. He experiences severe pain after bowel movements, but does not usually bleed. He usually moves his bowels once per day, but sometimes twice per day. He denies experiencing any fecal incontinence. Examination in the office on 2022 revealed enlarged external hemorrhoids that were most prominent on the right side. Right-sided and left-sided external hemorrhoids became more engorged with blood with straining in the left lateral decubitus position. Digital rectal examination revealed no masses, and anoscopy revealed significant internal hemorrhoidal enlargement in multiple areas. Surgical hemorrhoidectomy was indicated for treatment of the patient's symptoms. Rigid proctosigmoidoscopy was also indicated to fully evaluate the rectum for other significant pathology. The risks were discussed in detail, and the patient agreed to proceed. OPERATIVE FINDINGS:  There were significantly enlarged internal and external hemorrhoids in the right lateral, right posterior, left posterior, left lateral, and left anterior positions.   Rigid proctosigmoidoscopy to 21 cm from the anal verge was otherwise normal.    PROCEDURE:  After informed consent was obtained, the patient was taken to the operating room where standard monitoring devices were attached and adequate general endotracheal anesthesia was induced. He was then placed in the prone jackknife position on the operating table with all pressure points padded appropriately and with the lower extremities fitted with pneumatic compression stockings. The buttocks were retracted bilaterally using Mastisol and tape. Visual inspection and digital rectal examination were performed, and the rigid proctosigmoidoscope was lubricated and inserted transanally into the rectum. It was advanced carefully under direct vision to 21 cm from the anal verge, and then careful inspection was also performed during slow withdrawal of the scope. The findings were as noted above. The perineum was prepped and draped in the usual sterile fashion. Visual inspection within the anal canal was provided using the large Hill-Starr retractor. The areas of disease noted above in the findings section were each addressed by sharp excision with scalpel and scissors. Electrocautery was used sparingly. Proximal control of the main vascular pedicle of the internal hemorrhoidal components was performed using 3-0 Vicryl figure-of-eight sutures. Each of these wounds was closed using running and interrupted 3-0 Vicryl sutures, and in this way, hemostasis was achieved. In all, there were 5 major areas of internal and external hemorrhoidal disease that were excised. Final inspection revealed good hemostasis. 0.25% bupivacaine with epinephrine was infiltrated circumanally to provide some post-operative analgesia and an external dressing consisting of Xeroform, 4x4's, and an ABD was put in place. There were no apparent complications, and the patient appeared to have tolerated the procedure well. At its conclusion, he was extubated and transported in stable condition to the recovery room.         Argelia Branham MD [email or web address]  D:  03/09/2022 8:13  T:  03/09/2022 12:53  JOB #:  [id number]  CC:   MD Megan Cabrera MD Luz Juniper, DO

## 2022-08-15 ENCOUNTER — HOSPITAL ENCOUNTER (EMERGENCY)
Age: 37
Discharge: HOME | End: 2022-08-15
Attending: STUDENT IN AN ORGANIZED HEALTH CARE EDUCATION/TRAINING PROGRAM
Payer: COMMERCIAL

## 2022-08-15 VITALS
OXYGEN SATURATION: 97 % | DIASTOLIC BLOOD PRESSURE: 95 MMHG | TEMPERATURE: 98.5 F | RESPIRATION RATE: 14 BRPM | BODY MASS INDEX: 27 KG/M2 | WEIGHT: 168 LBS | SYSTOLIC BLOOD PRESSURE: 143 MMHG | HEIGHT: 66 IN | HEART RATE: 66 BPM

## 2022-08-15 DIAGNOSIS — R42 VERTIGO: Primary | ICD-10-CM

## 2022-08-15 LAB
ALBUMIN SERPL-MCNC: 4 G/DL (ref 3.5–5)
ALBUMIN/GLOB SERPL: 1 {RATIO} (ref 1.1–2.2)
ALP SERPL-CCNC: 103 U/L (ref 45–117)
ALT SERPL-CCNC: 37 U/L (ref 12–78)
ANION GAP SERPL CALC-SCNC: 6 MMOL/L (ref 5–15)
AST SERPL-CCNC: 21 U/L (ref 15–37)
ATRIAL RATE: 66 BPM
BASOPHILS # BLD: 0 K/UL (ref 0–0.1)
BASOPHILS NFR BLD: 1 % (ref 0–1)
BILIRUB SERPL-MCNC: 0.5 MG/DL (ref 0.2–1)
BUN SERPL-MCNC: 10 MG/DL (ref 6–20)
BUN/CREAT SERPL: 9 (ref 12–20)
CALCIUM SERPL-MCNC: 8.9 MG/DL (ref 8.5–10.1)
CALCULATED P AXIS, ECG09: 23 DEGREES
CALCULATED R AXIS, ECG10: 79 DEGREES
CALCULATED T AXIS, ECG11: 23 DEGREES
CHLORIDE SERPL-SCNC: 100 MMOL/L (ref 97–108)
CO2 SERPL-SCNC: 29 MMOL/L (ref 21–32)
CREAT SERPL-MCNC: 1.06 MG/DL (ref 0.7–1.3)
DIAGNOSIS, 93000: NORMAL
DIFFERENTIAL METHOD BLD: NORMAL
EOSINOPHIL # BLD: 0.2 K/UL (ref 0–0.4)
EOSINOPHIL NFR BLD: 2 % (ref 0–7)
ERYTHROCYTE [DISTWIDTH] IN BLOOD BY AUTOMATED COUNT: 13 % (ref 11.5–14.5)
GLOBULIN SER CALC-MCNC: 4.1 G/DL (ref 2–4)
GLUCOSE SERPL-MCNC: 122 MG/DL (ref 65–100)
HCT VFR BLD AUTO: 46.1 % (ref 36.6–50.3)
HGB BLD-MCNC: 16 G/DL (ref 12.1–17)
IMM GRANULOCYTES # BLD AUTO: 0 K/UL (ref 0–0.04)
IMM GRANULOCYTES NFR BLD AUTO: 0 % (ref 0–0.5)
LYMPHOCYTES # BLD: 2.2 K/UL (ref 0.8–3.5)
LYMPHOCYTES NFR BLD: 28 % (ref 12–49)
MCH RBC QN AUTO: 30.3 PG (ref 26–34)
MCHC RBC AUTO-ENTMCNC: 34.7 G/DL (ref 30–36.5)
MCV RBC AUTO: 87.3 FL (ref 80–99)
MONOCYTES # BLD: 0.5 K/UL (ref 0–1)
MONOCYTES NFR BLD: 7 % (ref 5–13)
NEUTS SEG # BLD: 5 K/UL (ref 1.8–8)
NEUTS SEG NFR BLD: 62 % (ref 32–75)
NRBC # BLD: 0 K/UL (ref 0–0.01)
NRBC BLD-RTO: 0 PER 100 WBC
P-R INTERVAL, ECG05: 112 MS
PLATELET # BLD AUTO: 296 K/UL (ref 150–400)
PMV BLD AUTO: 9.3 FL (ref 8.9–12.9)
POTASSIUM SERPL-SCNC: 4 MMOL/L (ref 3.5–5.1)
PROT SERPL-MCNC: 8.1 G/DL (ref 6.4–8.2)
Q-T INTERVAL, ECG07: 354 MS
QRS DURATION, ECG06: 96 MS
QTC CALCULATION (BEZET), ECG08: 371 MS
RBC # BLD AUTO: 5.28 M/UL (ref 4.1–5.7)
SODIUM SERPL-SCNC: 135 MMOL/L (ref 136–145)
TROPONIN-HIGH SENSITIVITY: <4 NG/L (ref 0–76)
VENTRICULAR RATE, ECG03: 66 BPM
WBC # BLD AUTO: 8.1 K/UL (ref 4.1–11.1)

## 2022-08-15 PROCEDURE — 80053 COMPREHEN METABOLIC PANEL: CPT

## 2022-08-15 PROCEDURE — 93005 ELECTROCARDIOGRAM TRACING: CPT

## 2022-08-15 PROCEDURE — 99284 EMERGENCY DEPT VISIT MOD MDM: CPT

## 2022-08-15 PROCEDURE — 84484 ASSAY OF TROPONIN QUANT: CPT

## 2022-08-15 PROCEDURE — 36415 COLL VENOUS BLD VENIPUNCTURE: CPT

## 2022-08-15 PROCEDURE — 85025 COMPLETE CBC W/AUTO DIFF WBC: CPT

## 2022-08-15 RX ORDER — MECLIZINE HCL 12.5 MG 12.5 MG/1
25 TABLET ORAL
Status: DISCONTINUED | OUTPATIENT
Start: 2022-08-15 | End: 2022-08-15

## 2022-08-15 RX ORDER — ISOTRETINOIN 30 MG/1
60 CAPSULE ORAL
COMMUNITY

## 2022-08-15 NOTE — ED TRIAGE NOTES
Patient arrives ambulatory to ed with complaints of dizziness since yesterday. Patient states dizziness got worse last night when he laid down, states spinning sensation is not as bad today. Also states he feels off balance. History of vertigo. States this is different and feels \"drunk\".

## 2022-08-15 NOTE — ED PROVIDER NOTES
Patient is a 82-year-old male with history of hypertension presenting to the ED with dizziness that started yesterday while riding in a car with his brother. Got worse overnight when he laid down and moved his head in certain positions. Patient has a history of vertigo about 10 years ago and was seen by ENT with improvement with Epley maneuver. The history is provided by the patient. Dizziness  This is a recurrent problem. The current episode started yesterday. The problem has been gradually improving. There was no focality noted. Primary symptoms include loss of balance. There has been no fever. Associated symptoms include nausea. Pertinent negatives include no shortness of breath, no chest pain and no altered mental status. Associated medical issues do not include trauma, seizures or CVA.       Past Medical History:   Diagnosis Date    Anxiety     COVID-19 vaccine series completed     Moderna dose #1 received on 4/25/2021; dose #2 received on 5/23/21    GERD (gastroesophageal reflux disease)     Hypertension     Ill-defined condition 2022    ANXIETY INDUCED HYPERTENSION        Past Surgical History:   Procedure Laterality Date    HX HEMORRHOIDECTOMY      HX UROLOGICAL      Surgery for undescended testicle         Family History:   Problem Relation Age of Onset    Epilepsy Mother     Depression Mother     Anxiety Mother     Bipolar Disorder Mother     Hypertension Father     Macular Degen Paternal Grandmother     Diabetes Paternal Grandfather     Heart Disease Paternal Grandfather     Anesth Problems Neg Hx        Social History     Socioeconomic History    Marital status:      Spouse name: Not on file    Number of children: Not on file    Years of education: Not on file    Highest education level: Not on file   Occupational History    Not on file   Tobacco Use    Smoking status: Former     Packs/day: 1.00     Years: 20.00     Pack years: 20.00     Types: Cigarettes     Quit date: 3/4/1995     Years since quittin.4    Smokeless tobacco: Current    Tobacco comments:     DIPS - SMOKELESS TOBACCO    Substance and Sexual Activity    Alcohol use: No    Drug use: No    Sexual activity: Yes     Partners: Female   Other Topics Concern    Not on file   Social History Narrative    Not on file     Social Determinants of Health     Financial Resource Strain: Not on file   Food Insecurity: Not on file   Transportation Needs: Not on file   Physical Activity: Not on file   Stress: Not on file   Social Connections: Not on file   Intimate Partner Violence: Not on file   Housing Stability: Not on file         ALLERGIES: Reglan [metoclopramide]    Review of Systems   Constitutional: Negative. HENT: Negative. Eyes: Negative. Respiratory: Negative. Negative for shortness of breath. Cardiovascular: Negative. Negative for chest pain. Gastrointestinal:  Positive for nausea. Endocrine: Negative. Genitourinary: Negative. Musculoskeletal: Negative. Skin: Negative. Allergic/Immunologic: Negative. Neurological:  Positive for dizziness and loss of balance. Hematological: Negative. Psychiatric/Behavioral: Negative. Vitals:    08/15/22 1755   BP: (!) 182/111   Pulse: 83   Resp: 16   Temp: 98.5 °F (36.9 °C)   SpO2: 100%   Weight: 76.2 kg (168 lb)   Height: 5' 6\" (1.676 m)            Physical Exam  Vitals and nursing note reviewed. Constitutional:       General: He is not in acute distress. Appearance: Normal appearance. He is ill-appearing. HENT:      Head: Normocephalic and atraumatic. Right Ear: External ear normal.      Left Ear: External ear normal.      Nose: Nose normal.   Eyes:      Extraocular Movements: Extraocular movements intact. Conjunctiva/sclera: Conjunctivae normal.   Cardiovascular:      Rate and Rhythm: Normal rate. Pulses: Normal pulses. Radial pulses are 2+ on the right side and 2+ on the left side. Heart sounds: Normal heart sounds. Pulmonary:      Effort: Pulmonary effort is normal.      Breath sounds: Normal breath sounds. Abdominal:      General: Abdomen is flat. There is no distension. Tenderness: There is no abdominal tenderness. Musculoskeletal:         General: No deformity or signs of injury. Normal range of motion. Cervical back: Normal range of motion and neck supple. No tenderness. Skin:     General: Skin is warm and dry. Capillary Refill: Capillary refill takes less than 2 seconds. Neurological:      General: No focal deficit present. Mental Status: He is alert and oriented to person, place, and time. Cranial Nerves: No cranial nerve deficit. Sensory: No sensory deficit. Motor: No weakness. Coordination: Coordination normal.      Gait: Gait normal.      Comments: Rose-Hallpike indeterminate, patient's symptoms worse upon sitting up on the left side. Psychiatric:         Attention and Perception: Attention normal.         Mood and Affect: Mood normal.         Behavior: Behavior normal.        [facility]    ED Course as of 08/19/22 2058   Mon Aug 15, 2022   1801 EKG interpretation:   Rhythm: normal sinus rhythm; and regular . Rate (approx.): 66; Axis: normal; Intervals: normal ; ST/T wave: non-specific changes; EKG documented and interpreted by Pebbles Holloway.  Yulisa Staton MD, Emergency Medicine.   [AL]      ED Course User Index  [AL] Heather Luna MD     LABORATORY RESULTS:  Labs Reviewed   METABOLIC PANEL, COMPREHENSIVE - Abnormal; Notable for the following components:       Result Value    Sodium 135 (*)     Glucose 122 (*)     BUN/Creatinine ratio 9 (*)     Globulin 4.1 (*)     A-G Ratio 1.0 (*)     All other components within normal limits   CBC WITH AUTOMATED DIFF   TROPONIN-HIGH SENSITIVITY       IMAGING RESULTS:  No orders to display       MEDICATIONS GIVEN:  Medications - No data to display    Differential diagnosis: BPPV, vertigo, lightheadedness, dehydration, near syncope    ED physician interpretation of EKG: No STEMI. See my interpretation EKG in ED course above. ED physician interpretation of laboratory results: Limits, no signs of dehydration or electrolyte imbalance. Troponin within normal limits. No anemia    MDM: Patient is a healthy 44-year-old male presenting to the ED after an episode of dizziness and vertigo. Work-up other causes generally unremarkable. Most likely patient has a vertigo type illness. As patient has had history of this in the past is improving over this should be tried again at home and close follow-up with ENT if not improving. No signs of stroke or any other signs of focal neurologic deficit. Patient feeling somewhat better from initial presentation    Further personalized recommendations for outpatient care as below. Key discharge instructions and summary of care: You presented to the ED with an episode of vertigo/dizziness. You not having neurodeficits concerning for stroke. Most likely this is a BPPV type vertigo. Take meclizine over-the-counter for symptom management 25 to 50 mg. Do the Epley maneuver when you get home. If symptoms not improving follow-up with ENT. The patient has been re-evaluated and feeling better. Patient is stable for discharge. All available radiology and laboratory results have been reviewed with patient and/or available family. Patient and/or family verbally conveyed their understanding and agreement of the patient's signs, symptoms, diagnosis, treatment and prognosis and additionally agree to follow-up as recommended in the discharge instructions or to return to the Emergency Department should their condition change or worsen prior to their follow-up appointment. All questions have been answered and patient and/or available family express understanding. IMPRESSION:  1. Vertigo        DISPOSITION: Discharged     Reji Alarcon MD      Procedures

## 2022-08-15 NOTE — DISCHARGE INSTRUCTIONS
You presented to the ED with an episode of vertigo/dizziness. You not having neurodeficits concerning for stroke. Most likely this is a BPPV type vertigo. Take meclizine over-the-counter for symptom management 25 to 50 mg. Do the Epley maneuver when you get home. If symptoms not improving follow-up with ENT.

## 2022-08-15 NOTE — Clinical Note
[facility]  [address]  [address]  [phone]    Work/School Note    Date: 8/15/2022    To Whom It May concern:    Alma Holman was seen and treated today in the emergency room by the following provider(s):  Attending Provider: Ying Agee MD.      Alma Holman is excused from work/school on 08/15/22 and 08/16/22. He is medically clear to return to work/school on 8/17/2022. Sincerely,          Saloni Perla MD

## 2022-11-17 ENCOUNTER — TRANSCRIBE ORDER (OUTPATIENT)
Dept: SCHEDULING | Age: 37
End: 2022-11-17

## 2022-11-17 DIAGNOSIS — Z00.00 PREVENTATIVE HEALTH CARE: Primary | ICD-10-CM

## 2023-03-07 ENCOUNTER — TRANSCRIBE ORDER (OUTPATIENT)
Dept: SCHEDULING | Age: 38
End: 2023-03-07

## 2023-03-07 DIAGNOSIS — K21.9 GERD (GASTROESOPHAGEAL REFLUX DISEASE): ICD-10-CM

## 2023-03-07 DIAGNOSIS — R11.2 NAUSEA & VOMITING: Primary | ICD-10-CM

## 2023-04-18 ENCOUNTER — HOSPITAL ENCOUNTER (OUTPATIENT)
Dept: NUCLEAR MEDICINE | Age: 38
Discharge: HOME | End: 2023-04-18
Attending: INTERNAL MEDICINE
Payer: COMMERCIAL

## 2023-04-18 DIAGNOSIS — R11.2 NAUSEA AND VOMITING: ICD-10-CM

## 2023-04-18 PROCEDURE — 78226 HEPATOBILIARY SYSTEM IMAGING: CPT

## 2023-04-18 RX ORDER — KIT FOR THE PREPARATION OF TECHNETIUM TC 99M MEBROFENIN 45 MG/10ML
5.5 INJECTION, POWDER, LYOPHILIZED, FOR SOLUTION INTRAVENOUS
Status: COMPLETED | OUTPATIENT
Start: 2023-04-18 | End: 2023-04-18

## 2023-04-18 RX ADMIN — KIT FOR THE PREPARATION OF TECHNETIUM TC 99M MEBROFENIN 5.5 MILLICURIE: 45 INJECTION, POWDER, LYOPHILIZED, FOR SOLUTION INTRAVENOUS at 07:30

## 2023-04-22 DIAGNOSIS — Z00.00 PREVENTATIVE HEALTH CARE: Primary | ICD-10-CM

## 2023-05-20 ENCOUNTER — HOSPITAL ENCOUNTER (EMERGENCY)
Facility: HOSPITAL | Age: 38
Discharge: HOME | End: 2023-05-20
Attending: EMERGENCY MEDICINE
Payer: COMMERCIAL

## 2023-05-20 ENCOUNTER — APPOINTMENT (OUTPATIENT)
Facility: HOSPITAL | Age: 38
End: 2023-05-20
Payer: COMMERCIAL

## 2023-05-20 VITALS
WEIGHT: 164.24 LBS | TEMPERATURE: 98 F | BODY MASS INDEX: 25.78 KG/M2 | SYSTOLIC BLOOD PRESSURE: 117 MMHG | DIASTOLIC BLOOD PRESSURE: 78 MMHG | RESPIRATION RATE: 16 BRPM | HEART RATE: 81 BPM | HEIGHT: 67 IN | OXYGEN SATURATION: 96 %

## 2023-05-20 DIAGNOSIS — J20.9 ACUTE BRONCHITIS, UNSPECIFIED ORGANISM: Primary | ICD-10-CM

## 2023-05-20 PROCEDURE — 99283 EMERGENCY DEPT VISIT LOW MDM: CPT

## 2023-05-20 RX ORDER — ALBUTEROL SULFATE 90 UG/1
2 AEROSOL, METERED RESPIRATORY (INHALATION) 4 TIMES DAILY PRN
Qty: 1 EACH | Refills: 0 | Status: SHIPPED | OUTPATIENT
Start: 2023-05-20 | End: 2023-05-20 | Stop reason: SDUPTHER

## 2023-05-20 RX ORDER — PSEUDOEPHEDRINE HCL 30 MG
30 TABLET ORAL EVERY 6 HOURS PRN
Qty: 28 TABLET | Refills: 0 | Status: SHIPPED | OUTPATIENT
Start: 2023-05-20 | End: 2023-05-27

## 2023-05-20 RX ORDER — ALBUTEROL SULFATE 90 UG/1
2 AEROSOL, METERED RESPIRATORY (INHALATION) 4 TIMES DAILY PRN
Qty: 1 EACH | Refills: 0 | Status: SHIPPED | OUTPATIENT
Start: 2023-05-20

## 2023-05-20 RX ORDER — PSEUDOEPHEDRINE HCL 30 MG
30 TABLET ORAL EVERY 6 HOURS PRN
Qty: 28 TABLET | Refills: 0 | Status: SHIPPED | OUTPATIENT
Start: 2023-05-20 | End: 2023-05-20 | Stop reason: SDUPTHER

## 2023-05-20 RX ORDER — BENZONATATE 100 MG/1
100 CAPSULE ORAL 3 TIMES DAILY PRN
Qty: 21 CAPSULE | Refills: 0 | Status: SHIPPED | OUTPATIENT
Start: 2023-05-20 | End: 2023-05-20 | Stop reason: SDUPTHER

## 2023-05-20 RX ORDER — BENZONATATE 100 MG/1
100 CAPSULE ORAL 3 TIMES DAILY PRN
Qty: 21 CAPSULE | Refills: 0 | Status: SHIPPED | OUTPATIENT
Start: 2023-05-20 | End: 2023-05-27

## 2023-05-20 ASSESSMENT — PAIN SCALES - GENERAL: PAINLEVEL_OUTOF10: 4

## 2023-05-20 ASSESSMENT — ENCOUNTER SYMPTOMS
COLOR CHANGE: 0
ABDOMINAL PAIN: 0
SHORTNESS OF BREATH: 0
TROUBLE SWALLOWING: 0
COUGH: 1

## 2023-05-20 ASSESSMENT — PAIN DESCRIPTION - DESCRIPTORS: DESCRIPTORS: ACHING

## 2023-05-20 ASSESSMENT — PAIN - FUNCTIONAL ASSESSMENT: PAIN_FUNCTIONAL_ASSESSMENT: 0-10

## 2023-05-20 NOTE — ED NOTES
Pt discharged in stable condition at this time. MD/DYANA reviewed discharge instructions, prescriptions, and follow up with patient at bedside. Pt verbalized understanding and denies any needs or questions at this time. Pt NAD on DC ambulatory with his wife who will take him home.         Jayesh Kam RN  05/20/23 7755

## 2023-06-26 ENCOUNTER — HOSPITAL ENCOUNTER (EMERGENCY)
Facility: HOSPITAL | Age: 38
Discharge: HOME | End: 2023-06-26
Attending: EMERGENCY MEDICINE
Payer: MEDICARE

## 2023-06-26 VITALS
TEMPERATURE: 98 F | HEIGHT: 70 IN | RESPIRATION RATE: 18 BRPM | WEIGHT: 315 LBS | SYSTOLIC BLOOD PRESSURE: 122 MMHG | OXYGEN SATURATION: 96 % | BODY MASS INDEX: 45.1 KG/M2 | HEART RATE: 83 BPM | DIASTOLIC BLOOD PRESSURE: 86 MMHG

## 2023-06-26 DIAGNOSIS — R60.0 BILATERAL LOWER EXTREMITY EDEMA: Primary | ICD-10-CM

## 2023-06-26 DIAGNOSIS — M79.89 LEG SWELLING: ICD-10-CM

## 2023-06-26 DIAGNOSIS — M25.473 ANKLE SWELLING: ICD-10-CM

## 2023-06-26 LAB
ALBUMIN SERPL-MCNC: 4.1 G/DL (ref 3.5–5)
ALBUMIN/GLOB SERPL: 1 RATIO (ref 1.1–2)
ALP SERPL-CCNC: 53 UNIT/L (ref 40–150)
ALT SERPL-CCNC: 36 UNIT/L (ref 0–55)
AST SERPL-CCNC: 43 UNIT/L (ref 5–34)
BASOPHILS # BLD AUTO: 0.06 X10(3)/MCL
BASOPHILS NFR BLD AUTO: 0.7 %
BILIRUBIN DIRECT+TOT PNL SERPL-MCNC: 1.3 MG/DL
BNP BLD-MCNC: <10 PG/ML
BUN SERPL-MCNC: 9.4 MG/DL (ref 8.9–20.6)
CALCIUM SERPL-MCNC: 9.7 MG/DL (ref 8.4–10.2)
CHLORIDE SERPL-SCNC: 104 MMOL/L (ref 98–107)
CO2 SERPL-SCNC: 26 MMOL/L (ref 22–29)
CREAT SERPL-MCNC: 0.85 MG/DL (ref 0.73–1.18)
EOSINOPHIL # BLD AUTO: 0.32 X10(3)/MCL (ref 0–0.9)
EOSINOPHIL NFR BLD AUTO: 3.6 %
ERYTHROCYTE [DISTWIDTH] IN BLOOD BY AUTOMATED COUNT: 13.5 % (ref 11.5–17)
GFR SERPLBLD CREATININE-BSD FMLA CKD-EPI: >60 MLS/MIN/1.73/M2
GLOBULIN SER-MCNC: 4.3 GM/DL (ref 2.4–3.5)
GLUCOSE SERPL-MCNC: 93 MG/DL (ref 74–100)
HCT VFR BLD AUTO: 47.2 % (ref 42–52)
HGB BLD-MCNC: 15.6 G/DL (ref 14–18)
IMM GRANULOCYTES # BLD AUTO: 0.03 X10(3)/MCL (ref 0–0.04)
IMM GRANULOCYTES NFR BLD AUTO: 0.3 %
LYMPHOCYTES # BLD AUTO: 3.76 X10(3)/MCL (ref 0.6–4.6)
LYMPHOCYTES NFR BLD AUTO: 42.8 %
MCH RBC QN AUTO: 29.2 PG (ref 27–31)
MCHC RBC AUTO-ENTMCNC: 33.1 G/DL (ref 33–36)
MCV RBC AUTO: 88.4 FL (ref 80–94)
MONOCYTES # BLD AUTO: 0.85 X10(3)/MCL (ref 0.1–1.3)
MONOCYTES NFR BLD AUTO: 9.7 %
NEUTROPHILS # BLD AUTO: 3.77 X10(3)/MCL (ref 2.1–9.2)
NEUTROPHILS NFR BLD AUTO: 42.9 %
NRBC BLD AUTO-RTO: 0 %
PLATELET # BLD AUTO: 326 X10(3)/MCL (ref 130–400)
PMV BLD AUTO: 9.5 FL (ref 7.4–10.4)
POTASSIUM SERPL-SCNC: 3.9 MMOL/L (ref 3.5–5.1)
PROT SERPL-MCNC: 8.4 GM/DL (ref 6.4–8.3)
RBC # BLD AUTO: 5.34 X10(6)/MCL (ref 4.7–6.1)
SODIUM SERPL-SCNC: 140 MMOL/L (ref 136–145)
T4 FREE SERPL-MCNC: 0.95 NG/DL (ref 0.7–1.48)
TROPONIN I SERPL-MCNC: <0.01 NG/ML (ref 0–0.04)
TSH SERPL-ACNC: 1.58 UIU/ML (ref 0.35–4.94)
WBC # SPEC AUTO: 8.79 X10(3)/MCL (ref 4.5–11.5)

## 2023-06-26 PROCEDURE — 99285 EMERGENCY DEPT VISIT HI MDM: CPT | Mod: 25

## 2023-06-26 PROCEDURE — 85025 COMPLETE CBC W/AUTO DIFF WBC: CPT | Performed by: PHYSICIAN ASSISTANT

## 2023-06-26 PROCEDURE — 80053 COMPREHEN METABOLIC PANEL: CPT | Performed by: PHYSICIAN ASSISTANT

## 2023-06-26 PROCEDURE — 84439 ASSAY OF FREE THYROXINE: CPT | Performed by: NURSE PRACTITIONER

## 2023-06-26 PROCEDURE — 84443 ASSAY THYROID STIM HORMONE: CPT | Performed by: NURSE PRACTITIONER

## 2023-06-26 PROCEDURE — 84484 ASSAY OF TROPONIN QUANT: CPT | Performed by: PHYSICIAN ASSISTANT

## 2023-06-26 PROCEDURE — 83880 ASSAY OF NATRIURETIC PEPTIDE: CPT | Performed by: PHYSICIAN ASSISTANT

## 2023-06-26 RX ORDER — FUROSEMIDE 40 MG/1
40 TABLET ORAL DAILY
Qty: 3 TABLET | Refills: 0 | Status: SHIPPED | OUTPATIENT
Start: 2023-06-26 | End: 2023-06-29

## 2023-06-27 NOTE — ED PROVIDER NOTES
Encounter Date: 6/26/2023       History     Chief Complaint   Patient presents with    Ankle Pain     Ankle pain with swelling since Friday, denies any tingling numbness, denies any SOB/ trauma/injury to ankle. Hx of knee surgery, htn.      See MDM    The history is provided by the patient. No  was used.   Review of patient's allergies indicates:   Allergen Reactions    Nuts [tree nut] Swelling     Patient states he is allergic to cashews, states oral swelling and hives     Past Medical History:   Diagnosis Date    Allergy     Fever blister     Hypertension     Irregular heart beat     Joint pain     Left ACL tear 6/8/2015     Past Surgical History:   Procedure Laterality Date    KNEE SURGERY      TONSILLECTOMY       Family History   Problem Relation Age of Onset    Aneurysm Mother     Hypertension Mother     No Known Problems Father      Social History     Tobacco Use    Smoking status: Every Day     Packs/day: 0.50     Years: 10.00     Pack years: 5.00     Types: Cigarettes    Smokeless tobacco: Never   Substance Use Topics    Alcohol use: Yes     Comment: Occasional     Drug use: No     Review of Systems   Cardiovascular:  Positive for leg swelling.   All other systems reviewed and are negative.    Physical Exam     Initial Vitals [06/26/23 1333]   BP Pulse Resp Temp SpO2   108/78 90 18 98.3 °F (36.8 °C) 97 %      MAP       --         Physical Exam    Nursing note and vitals reviewed.  Constitutional: He appears well-developed and well-nourished.   Cardiovascular:  Normal rate, regular rhythm, normal heart sounds and intact distal pulses.           Pulmonary/Chest: Breath sounds normal. No respiratory distress.   Abdominal: He exhibits no distension.   Morbidly obese   Musculoskeletal:      Right lower leg: Tenderness present. 2+ Pitting Edema present.      Left lower leg: Tenderness present. 2+ Pitting Edema present.     Neurological: He is alert and oriented to person, place, and time.    Skin: Skin is warm and dry.   Psychiatric: He has a normal mood and affect.       ED Course   Procedures  Labs Reviewed   COMPREHENSIVE METABOLIC PANEL - Abnormal; Notable for the following components:       Result Value    Protein Total 8.4 (*)     Globulin 4.3 (*)     Albumin/Globulin Ratio 1.0 (*)     Aspartate Aminotransferase 43 (*)     All other components within normal limits   B-TYPE NATRIURETIC PEPTIDE - Normal   TROPONIN I - Normal   TSH - Normal   T4, FREE - Normal   CBC W/ AUTO DIFFERENTIAL    Narrative:     The following orders were created for panel order CBC auto differential.  Procedure                               Abnormality         Status                     ---------                               -----------         ------                     CBC with Differential[[id number]]                            Final result                 Please view results for these tests on the individual orders.   CBC WITH DIFFERENTIAL          Imaging Results              X-Ray Ankle Complete Left (Final result)  Result time 06/26/23 14:04:36   Procedure changed from X-Ray Ankle Complete Bilateral     Final result by Andre Martin MD (06/26/23 14:04:36)                   Impression:      No acute abnormalities are seen      Electronically signed by: Andre Martin MD  Date:    06/26/2023  Time:    14:04               Narrative:    EXAMINATION:  XR ANKLE COMPLETE 3 VIEW LEFT    CLINICAL HISTORY:  ankle swelling ; Effusion, unspecified ankle    TECHNIQUE:  AP, lateral and oblique views of the left ankle were performed.    COMPARISON:  None    FINDINGS:  There are no fractures seen.  There is no dislocation.  There is soft tissue swelling.                                       X-Ray Ankle Complete Right (Final result)  Result time 06/26/23 14:06:12      Final result by Andre Martin MD (06/26/23 14:06:12)                   Impression:      No acute abnormalities are seen      Electronically signed by: Andre  MD Mario  Date:    06/26/2023  Time:    14:06               Narrative:    EXAMINATION:  XR ANKLE COMPLETE 3 VIEW RIGHT    CLINICAL HISTORY:  ankle swelling ;    TECHNIQUE:  AP, lateral, and oblique images of the right ankle were performed.    COMPARISON:  None    FINDINGS:  There are no fractures seen.  There is no dislocation.  There are no bony lesions noted.                                       Medications - No data to display  Medical Decision Making:   Differential Diagnosis:   Includes but not limited to dVT, cellulitis, fluid overload, PVD  Clinical Tests:   Lab Tests: Ordered and Reviewed  The following lab test(s) were unremarkable: CBC, CMP and BNP  Radiological Study: Ordered and Reviewed  ED Management:  37 y/o male presents with bilateral LE edema which seems to happen sometimes but this is the worse more so in the lower legs and having marisel ediscomfort. No sob. No cp. States no injury. He does admit taht in the aM when he wakes up they are less swollen but as day progresses its more.     Labs are unremarkable for acute findings. CV US neg for dVT. Discussed will give short 3 day course of lasix but that this is likely some venous congestion as well and needs to wear tj hose, watch salt intake, work on losing weight and follow up with primary care provider. No acute distress.                         Clinical Impression:   Final diagnoses:  [M25.473] Ankle swelling  [M79.89] Leg swelling  [R60.0] Bilateral lower extremity edema (Primary)        ED Disposition Condition    Discharge Stable          ED Prescriptions       Medication Sig Dispense Start Date End Date Auth. Provider    furosemide (LASIX) 40 MG tablet Take 1 tablet (40 mg total) by mouth once daily. for 3 days 3 tablet 6/26/2023 6/29/2023 STEWART Larry          Follow-up Information       Follow up With Specialties Details Why Contact Info    [place] MD Family Medicine Call in 1 week  [address] [facility]  [address]  [phone]               Kajal Mak, [facility]  06/26/23 2125

## 2025-02-13 PROBLEM — E78.00 PURE HYPERCHOLESTEROLEMIA: Status: ACTIVE | Noted: 2025-02-13

## 2025-02-13 PROBLEM — J06.9 ACUTE URI: Status: RESOLVED | Noted: 2019-08-11 | Resolved: 2025-02-13

## 2025-02-13 PROBLEM — I10 ESSENTIAL HYPERTENSION: Status: ACTIVE | Noted: 2025-02-13

## 2025-02-13 PROBLEM — R73.03 PREDIABETES: Status: ACTIVE | Noted: 2025-02-13

## 2025-02-14 PROBLEM — E66.01 MORBID OBESITY: Status: ACTIVE | Noted: 2025-02-14

## 2025-02-14 PROBLEM — G47.33 OSA (OBSTRUCTIVE SLEEP APNEA): Status: ACTIVE | Noted: 2025-02-14

## 2025-02-24 PROBLEM — Z87.828 HISTORY OF TEAR OF ACL (ANTERIOR CRUCIATE LIGAMENT): Status: ACTIVE | Noted: 2025-02-24

## 2025-03-31 ENCOUNTER — PATIENT MESSAGE (OUTPATIENT)
Dept: ADMINISTRATIVE | Facility: HOSPITAL | Age: 40
End: 2025-03-31
Payer: MEDICAID

## 2025-05-12 ENCOUNTER — PATIENT MESSAGE (OUTPATIENT)
Dept: ADMINISTRATIVE | Facility: HOSPITAL | Age: 40
End: 2025-05-12
Payer: MEDICAID

## 2025-06-10 ENCOUNTER — PATIENT MESSAGE (OUTPATIENT)
Dept: ADMINISTRATIVE | Facility: HOSPITAL | Age: 40
End: 2025-06-10
Payer: MEDICAID

## 2025-06-11 ENCOUNTER — HOSPITAL ENCOUNTER (EMERGENCY)
Facility: HOSPITAL | Age: 40
Discharge: HOME | End: 2025-06-11
Attending: STUDENT IN AN ORGANIZED HEALTH CARE EDUCATION/TRAINING PROGRAM
Payer: COMMERCIAL

## 2025-06-11 VITALS
RESPIRATION RATE: 16 BRPM | HEART RATE: 90 BPM | SYSTOLIC BLOOD PRESSURE: 134 MMHG | TEMPERATURE: 98.5 F | DIASTOLIC BLOOD PRESSURE: 90 MMHG | OXYGEN SATURATION: 97 %

## 2025-06-11 DIAGNOSIS — M25.511 ACUTE PAIN OF RIGHT SHOULDER: ICD-10-CM

## 2025-06-11 DIAGNOSIS — M54.12 CERVICAL RADICULOPATHY: Primary | ICD-10-CM

## 2025-06-11 DIAGNOSIS — M62.838 SPASM OF MUSCLE: ICD-10-CM

## 2025-06-11 PROCEDURE — 99284 EMERGENCY DEPT VISIT MOD MDM: CPT

## 2025-06-11 PROCEDURE — 96372 THER/PROPH/DIAG INJ SC/IM: CPT

## 2025-06-11 PROCEDURE — 6360000002 HC RX W HCPCS: Performed by: STUDENT IN AN ORGANIZED HEALTH CARE EDUCATION/TRAINING PROGRAM

## 2025-06-11 PROCEDURE — 6370000000 HC RX 637 (ALT 250 FOR IP): Performed by: STUDENT IN AN ORGANIZED HEALTH CARE EDUCATION/TRAINING PROGRAM

## 2025-06-11 RX ORDER — ACETAMINOPHEN 325 MG/1
650 TABLET ORAL
Status: COMPLETED | OUTPATIENT
Start: 2025-06-11 | End: 2025-06-11

## 2025-06-11 RX ORDER — LIDOCAINE HYDROCHLORIDE AND EPINEPHRINE 10; 10 MG/ML; UG/ML
20 INJECTION, SOLUTION INFILTRATION; PERINEURAL
Status: DISCONTINUED | OUTPATIENT
Start: 2025-06-11 | End: 2025-06-11

## 2025-06-11 RX ORDER — KETOROLAC TROMETHAMINE 30 MG/ML
15 INJECTION, SOLUTION INTRAMUSCULAR; INTRAVENOUS
Status: COMPLETED | OUTPATIENT
Start: 2025-06-11 | End: 2025-06-11

## 2025-06-11 RX ORDER — ACETAMINOPHEN 500 MG
500 TABLET ORAL 4 TIMES DAILY PRN
Qty: 120 TABLET | Refills: 0 | Status: SHIPPED | OUTPATIENT
Start: 2025-06-11

## 2025-06-11 RX ORDER — LIDOCAINE 4 G/G
1 PATCH TOPICAL DAILY
Status: DISCONTINUED | OUTPATIENT
Start: 2025-06-12 | End: 2025-06-11

## 2025-06-11 RX ORDER — LIDOCAINE 4 G/G
1 PATCH TOPICAL
Status: DISCONTINUED | OUTPATIENT
Start: 2025-06-11 | End: 2025-06-12 | Stop reason: HOSPADM

## 2025-06-11 RX ORDER — PREDNISONE 20 MG/1
20 TABLET ORAL DAILY
Qty: 4 TABLET | Refills: 0 | Status: SHIPPED | OUTPATIENT
Start: 2025-06-12 | End: 2025-06-16

## 2025-06-11 RX ORDER — METHOCARBAMOL 750 MG/1
1500 TABLET, FILM COATED ORAL ONCE
Status: COMPLETED | OUTPATIENT
Start: 2025-06-11 | End: 2025-06-11

## 2025-06-11 RX ORDER — METHOCARBAMOL 750 MG/1
750 TABLET, FILM COATED ORAL 4 TIMES DAILY
Qty: 40 TABLET | Refills: 0 | Status: SHIPPED | OUTPATIENT
Start: 2025-06-11 | End: 2025-06-21

## 2025-06-11 RX ORDER — PREDNISONE 20 MG/1
20 TABLET ORAL
Status: COMPLETED | OUTPATIENT
Start: 2025-06-11 | End: 2025-06-11

## 2025-06-11 RX ORDER — IBUPROFEN 600 MG/1
600 TABLET, FILM COATED ORAL 4 TIMES DAILY PRN
Qty: 40 TABLET | Refills: 0 | Status: SHIPPED | OUTPATIENT
Start: 2025-06-11

## 2025-06-11 RX ADMIN — ACETAMINOPHEN 650 MG: 325 TABLET ORAL at 22:37

## 2025-06-11 RX ADMIN — PREDNISONE 20 MG: 20 TABLET ORAL at 22:37

## 2025-06-11 RX ADMIN — KETOROLAC TROMETHAMINE 15 MG: 30 INJECTION, SOLUTION INTRAMUSCULAR; INTRAVENOUS at 22:42

## 2025-06-11 RX ADMIN — METHOCARBAMOL 1500 MG: 750 TABLET ORAL at 22:37

## 2025-06-11 ASSESSMENT — PAIN - FUNCTIONAL ASSESSMENT: PAIN_FUNCTIONAL_ASSESSMENT: 0-10

## 2025-06-11 ASSESSMENT — PAIN DESCRIPTION - PAIN TYPE: TYPE: ACUTE PAIN

## 2025-06-11 ASSESSMENT — PAIN SCALES - GENERAL
PAINLEVEL_OUTOF10: 6
PAINLEVEL_OUTOF10: 8

## 2025-06-11 ASSESSMENT — PAIN DESCRIPTION - LOCATION: LOCATION: SHOULDER

## 2025-06-11 ASSESSMENT — PAIN DESCRIPTION - ORIENTATION: ORIENTATION: RIGHT

## 2025-06-11 ASSESSMENT — PAIN DESCRIPTION - DESCRIPTORS: DESCRIPTORS: SHOOTING

## 2025-06-12 NOTE — ED NOTES
D/C per orders, ambulated from ED without difficulty.  Patient advised to remove lidocaine patch after 12 hours.   Patient advsied not to drive secondary to medication administration.  Visitor at bedside.

## 2025-06-12 NOTE — ED TRIAGE NOTES
Patient presents ambulatory to the ER with complaints of right arm/shoulder pain that started. Reports pain started in posterior right shoulder then went up posterior neck and down arm the next day. Reports no relief with OTC medications.

## 2025-06-12 NOTE — ED PROVIDER NOTES
understanding agreement plan        Risk  OTC drugs.  Prescription drug management.        Please note: This chart was created using medical dictation software. Although efforts were made to correct errors, please be aware of potential sound-alike names and dictations errors.         REASSESSMENT            CONSULTS:  None    PROCEDURES:  Unless otherwise noted below, none     Procedures          FINAL IMPRESSION      1. Cervical radiculopathy    2. Spasm of muscle    3. Acute pain of right shoulder            DISPOSITION/PLAN   DISPOSITION Decision To Discharge 06/11/2025 10:46:02 PM      PATIENT REFERRED TO:  Karo Goodwin DO  [address] [facility] [address] [phone]    Call in 2 days      [facility]  [address] [address] [address] [phone]  Go to   As needed    [facility]  In [address] [address] [phone]  Call in 1 week  As needed      DISCHARGE MEDICATIONS:  Discharge Medication List as of 6/11/2025 10:48 PM        START taking these medications    Details   predniSONE (DELTASONE) 20 MG tablet Take 1 tablet by mouth daily for 4 days, Disp-4 tablet, R-0Normal      methocarbamol (ROBAXIN) 750 MG tablet Take 1 tablet by mouth 4 times daily for 10 days, Disp-40 tablet, R-0Normal      ibuprofen (ADVIL;MOTRIN) 600 MG tablet Take 1 tablet by mouth 4 times daily as needed for Pain, Disp-40 tablet, R-0Normal      acetaminophen (TYLENOL) 500 MG tablet Take 1 tablet by mouth 4 times daily as needed for Pain, Disp-120 tablet, R-0Normal               (Please note that portions of this note were completed with a voice recognition program.  Efforts were made to edit the dictations but occasionally words are mis-transcribed.)    Ladonna Car MD (electronically signed)  Emergency Attending Physician               Ladonna Car MD  06/12/25 [id number]

## 2025-06-12 NOTE — DISCHARGE INSTRUCTIONS
For pain management, both Tylenol and ibuprofen (motrin) can be taken. They have a different chemical composition and may give more relief together than can be provided using either alone.  How to alternate Ibuprofen and Tylenol:  ? With food, You will take a dose of pain medication every three hours.  ? Start by taking 500 mg of Tylenol   ? 3 hours later take 600 mg of Motrin   ? 3 hours after taking the Motrin take 500 mg of Tylenol  ? 3 hours after that take 600 mg of Motrin.   ? You can continue to alternate Ibuprofen and Tylenol, up to the maximum doses of each medicine, but do not exceed the maximum dose of each.  ? Be sure to maintain proper dosing intervals between successive doses of the same medication (at least 4 hours)    Do not take more than 4,000 mg of Tylenol or 3,200 mg of Motrin in a 24-hour period!          Thank you for allowing us to provide you with medical care today.  We realize that you have many choices for your emergency care needs.  We thank you for choosing [facility].  Please choose us in the future for any continued health care needs.      The exam and treatment you received in the Emergency Department were for an emergent problem and are not intended as complete care. It is important that you follow up with a doctor, nurse practitioner, or physician assistant for ongoing care. If your symptoms worsen or you do not improve as expected and you are unable to reach your usual health care provider, you should return to the Emergency Department. We are available 24 hours a day.      Please make an appointment with your healthcare provider(s) for follow up of your Emergency Department visit.  Take this sheet with you when you go to your follow-up visit.

## (undated) DEVICE — BASIN ST MAJOR-NO CAUTERY: Brand: MEDLINE INDUSTRIES, INC.

## (undated) DEVICE — GLOVE SURG SZ 75 L1212IN FNGR THK138MIL BRN LTX FREE

## (undated) DEVICE — PENCIL SMK EVAC 10 FT BLADE ELECTRD ROCKER FOR TELSCP

## (undated) DEVICE — HYPODERMIC SAFETY NEEDLE: Brand: MONOJECT

## (undated) DEVICE — BLADE ASSEMB CLP HAIR FINE --

## (undated) DEVICE — REM POLYHESIVE ADULT PATIENT RETURN ELECTRODE: Brand: VALLEYLAB

## (undated) DEVICE — TAPE,CLOTH/SILK,CURAD,3"X10YD,LF,40/CS: Brand: CURAD

## (undated) DEVICE — POSITIONER HD REST FOAM CMFRT TCH

## (undated) DEVICE — SUTURE VCRL SZ 3-0 L27IN ABSRB VLT L26MM SH 1/2 CIR J316H

## (undated) DEVICE — PACK,LAPAROTOMY,2 REINFORCED GOWNS: Brand: MEDLINE

## (undated) DEVICE — TRAY PREP DRY W/ PREM GLV 2 APPL 6 SPNG 2 UNDPD 1 OVERWRAP

## (undated) DEVICE — MASTISOL ADHESIVE LIQ 2/3ML

## (undated) DEVICE — TOWEL SURG W17XL27IN STD BLU COT NONFENESTRATED PREWASHED

## (undated) DEVICE — DRESSING,GAUZE,XEROFORM,CURAD,5"X9",ST: Brand: CURAD

## (undated) DEVICE — PAD,ABDOMINAL,5"X9",ST,LF,25/BX: Brand: MEDLINE INDUSTRIES, INC.

## (undated) DEVICE — GARMENT,MEDLINE,DVT,INT,CALF,MED, GEN2: Brand: MEDLINE

## (undated) DEVICE — SYR 10ML LUER LOK 1/5ML GRAD --

## (undated) DEVICE — SPONGE GZ W4XL4IN COT 12 PLY TYP VII WVN C FLD DSGN

## (undated) DEVICE — SOLUTION IRRIG 1000ML 0.9% SOD CHL USP POUR PLAS BTL